# Patient Record
Sex: FEMALE | Race: OTHER | HISPANIC OR LATINO
[De-identification: names, ages, dates, MRNs, and addresses within clinical notes are randomized per-mention and may not be internally consistent; named-entity substitution may affect disease eponyms.]

---

## 2017-01-03 ENCOUNTER — APPOINTMENT (OUTPATIENT)
Dept: INTERNAL MEDICINE | Facility: CLINIC | Age: 67
End: 2017-01-03

## 2017-01-03 VITALS
BODY MASS INDEX: 26.87 KG/M2 | TEMPERATURE: 98.5 F | WEIGHT: 128 LBS | HEIGHT: 58 IN | DIASTOLIC BLOOD PRESSURE: 74 MMHG | SYSTOLIC BLOOD PRESSURE: 129 MMHG

## 2017-01-03 LAB
FLUAV SPEC QL CULT: NORMAL
FLUBV AG SPEC QL IA: NORMAL

## 2017-02-13 ENCOUNTER — APPOINTMENT (OUTPATIENT)
Dept: INTERNAL MEDICINE | Facility: CLINIC | Age: 67
End: 2017-02-13

## 2017-02-13 VITALS — DIASTOLIC BLOOD PRESSURE: 72 MMHG | SYSTOLIC BLOOD PRESSURE: 120 MMHG

## 2017-03-09 DIAGNOSIS — Z88.9 ALLERGY STATUS TO UNSPECIFIED DRUGS, MEDICAMENTS AND BIOLOGICAL SUBSTANCES: ICD-10-CM

## 2017-03-09 DIAGNOSIS — I86.8 VARICOSE VEINS OF OTHER SPECIFIED SITES: ICD-10-CM

## 2017-03-09 DIAGNOSIS — R35.1 NOCTURIA: ICD-10-CM

## 2017-03-22 PROBLEM — R35.1 NOCTURIA: Status: RESOLVED | Noted: 2017-03-22 | Resolved: 2017-03-22

## 2017-03-22 PROBLEM — Z88.9 HISTORY OF SEASONAL ALLERGIES: Status: RESOLVED | Noted: 2017-03-22 | Resolved: 2017-03-22

## 2017-04-11 ENCOUNTER — APPOINTMENT (OUTPATIENT)
Dept: INTERNAL MEDICINE | Facility: CLINIC | Age: 67
End: 2017-04-11

## 2017-04-11 ENCOUNTER — LABORATORY RESULT (OUTPATIENT)
Age: 67
End: 2017-04-11

## 2017-04-11 VITALS — SYSTOLIC BLOOD PRESSURE: 142 MMHG | DIASTOLIC BLOOD PRESSURE: 80 MMHG

## 2017-04-11 VITALS — BODY MASS INDEX: 27.61 KG/M2 | WEIGHT: 128 LBS | HEIGHT: 57.25 IN

## 2017-04-11 DIAGNOSIS — Z00.00 ENCOUNTER FOR GENERAL ADULT MEDICAL EXAMINATION W/OUT ABNORMAL FINDINGS: ICD-10-CM

## 2017-04-11 LAB
BILIRUB UR QL STRIP: NORMAL
CLARITY UR: CLEAR
COLLECTION METHOD: NORMAL
GLUCOSE UR-MCNC: NORMAL
HCG UR QL: 0.2 EU/DL
HGB UR QL STRIP.AUTO: NORMAL
KETONES UR-MCNC: NORMAL
LEUKOCYTE ESTERASE UR QL STRIP: NORMAL
NITRITE UR QL STRIP: NORMAL
PH UR STRIP: 6.5
PROT UR STRIP-MCNC: NORMAL
SP GR UR STRIP: 1.01

## 2017-04-11 RX ORDER — AZITHROMYCIN DIHYDRATE 250 MG/1
250 TABLET, FILM COATED ORAL
Qty: 1 | Refills: 0 | Status: DISCONTINUED | COMMUNITY
Start: 2017-01-03 | End: 2017-04-11

## 2017-04-12 LAB
25(OH)D3 SERPL-MCNC: 29.7 NG/ML
ALBUMIN SERPL ELPH-MCNC: 4.4 G/DL
ALP BLD-CCNC: 78 U/L
ALT SERPL-CCNC: 13 U/L
ANION GAP SERPL CALC-SCNC: 17 MMOL/L
AST SERPL-CCNC: 22 U/L
BASOPHILS # BLD AUTO: 0.02 K/UL
BASOPHILS NFR BLD AUTO: 0.3 %
BILIRUB SERPL-MCNC: 0.4 MG/DL
BUN SERPL-MCNC: 22 MG/DL
CALCIUM SERPL-MCNC: 10 MG/DL
CHLORIDE SERPL-SCNC: 104 MMOL/L
CHOLEST SERPL-MCNC: 224 MG/DL
CHOLEST/HDLC SERPL: 4.4 RATIO
CO2 SERPL-SCNC: 23 MMOL/L
CREAT SERPL-MCNC: 0.94 MG/DL
EOSINOPHIL # BLD AUTO: 0.12 K/UL
EOSINOPHIL NFR BLD AUTO: 1.7 %
GLUCOSE SERPL-MCNC: 77 MG/DL
HBA1C MFR BLD HPLC: 5.7 %
HCT VFR BLD CALC: 39.5 %
HDLC SERPL-MCNC: 51 MG/DL
HGB BLD-MCNC: 12.3 G/DL
IMM GRANULOCYTES NFR BLD AUTO: 0.1 %
LDLC SERPL CALC-MCNC: 144 MG/DL
LYMPHOCYTES # BLD AUTO: 2.27 K/UL
LYMPHOCYTES NFR BLD AUTO: 32.7 %
MAN DIFF?: NORMAL
MCHC RBC-ENTMCNC: 29.8 PG
MCHC RBC-ENTMCNC: 31.1 GM/DL
MCV RBC AUTO: 95.6 FL
MONOCYTES # BLD AUTO: 0.55 K/UL
MONOCYTES NFR BLD AUTO: 7.9 %
NEUTROPHILS # BLD AUTO: 3.97 K/UL
NEUTROPHILS NFR BLD AUTO: 57.3 %
PLATELET # BLD AUTO: 250 K/UL
POTASSIUM SERPL-SCNC: 4.6 MMOL/L
PROT SERPL-MCNC: 7.7 G/DL
RBC # BLD: 4.13 M/UL
RBC # FLD: 14.8 %
SAVE SPECIMEN: NORMAL
SODIUM SERPL-SCNC: 144 MMOL/L
T3RU NFR SERPL: 0.99 INDEX
T4 SERPL-MCNC: 6.4 UG/DL
TRIGL SERPL-MCNC: 147 MG/DL
TSH SERPL-ACNC: 2.56 UIU/ML
URATE SERPL-MCNC: 3.9 MG/DL
WBC # FLD AUTO: 6.94 K/UL

## 2017-05-04 ENCOUNTER — APPOINTMENT (OUTPATIENT)
Dept: INTERNAL MEDICINE | Facility: CLINIC | Age: 67
End: 2017-05-04

## 2017-05-04 VITALS — HEIGHT: 57.25 IN | BODY MASS INDEX: 28.48 KG/M2 | WEIGHT: 132 LBS | TEMPERATURE: 97.9 F

## 2017-06-20 ENCOUNTER — APPOINTMENT (OUTPATIENT)
Dept: MRI IMAGING | Facility: CLINIC | Age: 67
End: 2017-06-20

## 2017-06-20 ENCOUNTER — OUTPATIENT (OUTPATIENT)
Dept: OUTPATIENT SERVICES | Facility: HOSPITAL | Age: 67
LOS: 1 days | End: 2017-06-20
Payer: MEDICARE

## 2017-06-20 DIAGNOSIS — Z00.8 ENCOUNTER FOR OTHER GENERAL EXAMINATION: ICD-10-CM

## 2017-06-20 PROCEDURE — 73221 MRI JOINT UPR EXTREM W/O DYE: CPT

## 2017-09-27 ENCOUNTER — APPOINTMENT (OUTPATIENT)
Dept: ORTHOPEDIC SURGERY | Facility: CLINIC | Age: 67
End: 2017-09-27
Payer: MEDICARE

## 2017-09-27 VITALS — BODY MASS INDEX: 26.21 KG/M2 | WEIGHT: 130 LBS | HEIGHT: 59 IN

## 2017-09-27 VITALS — DIASTOLIC BLOOD PRESSURE: 88 MMHG | SYSTOLIC BLOOD PRESSURE: 157 MMHG | HEART RATE: 62 BPM

## 2017-09-27 DIAGNOSIS — M75.21 BICIPITAL TENDINITIS, RIGHT SHOULDER: ICD-10-CM

## 2017-09-27 PROCEDURE — 99203 OFFICE O/P NEW LOW 30 MIN: CPT

## 2017-10-07 ENCOUNTER — TRANSCRIPTION ENCOUNTER (OUTPATIENT)
Age: 67
End: 2017-10-07

## 2017-10-09 ENCOUNTER — APPOINTMENT (OUTPATIENT)
Dept: INTERNAL MEDICINE | Facility: CLINIC | Age: 67
End: 2017-10-09
Payer: MEDICARE

## 2017-10-09 VITALS
WEIGHT: 127 LBS | SYSTOLIC BLOOD PRESSURE: 121 MMHG | TEMPERATURE: 98.5 F | DIASTOLIC BLOOD PRESSURE: 83 MMHG | BODY MASS INDEX: 25.6 KG/M2 | HEART RATE: 81 BPM | HEIGHT: 59 IN

## 2017-10-09 PROCEDURE — 99214 OFFICE O/P EST MOD 30 MIN: CPT

## 2018-01-16 ENCOUNTER — OUTPATIENT (OUTPATIENT)
Dept: OUTPATIENT SERVICES | Facility: HOSPITAL | Age: 68
LOS: 1 days | End: 2018-01-16
Payer: MEDICARE

## 2018-01-16 VITALS
WEIGHT: 130.07 LBS | DIASTOLIC BLOOD PRESSURE: 72 MMHG | HEART RATE: 60 BPM | SYSTOLIC BLOOD PRESSURE: 128 MMHG | OXYGEN SATURATION: 99 % | RESPIRATION RATE: 16 BRPM | HEIGHT: 58 IN | TEMPERATURE: 99 F

## 2018-01-16 DIAGNOSIS — S46.811A STRAIN OF OTHER MUSCLES, FASCIA AND TENDONS AT SHOULDER AND UPPER ARM LEVEL, RIGHT ARM, INITIAL ENCOUNTER: ICD-10-CM

## 2018-01-16 DIAGNOSIS — G47.33 OBSTRUCTIVE SLEEP APNEA (ADULT) (PEDIATRIC): ICD-10-CM

## 2018-01-16 DIAGNOSIS — K21.9 GASTRO-ESOPHAGEAL REFLUX DISEASE WITHOUT ESOPHAGITIS: ICD-10-CM

## 2018-01-16 DIAGNOSIS — Z98.890 OTHER SPECIFIED POSTPROCEDURAL STATES: Chronic | ICD-10-CM

## 2018-01-16 LAB
BUN SERPL-MCNC: 18 MG/DL — SIGNIFICANT CHANGE UP (ref 7–23)
CALCIUM SERPL-MCNC: 9.6 MG/DL — SIGNIFICANT CHANGE UP (ref 8.4–10.5)
CHLORIDE SERPL-SCNC: 104 MMOL/L — SIGNIFICANT CHANGE UP (ref 98–107)
CO2 SERPL-SCNC: 28 MMOL/L — SIGNIFICANT CHANGE UP (ref 22–31)
CREAT SERPL-MCNC: 0.83 MG/DL — SIGNIFICANT CHANGE UP (ref 0.5–1.3)
GLUCOSE SERPL-MCNC: 80 MG/DL — SIGNIFICANT CHANGE UP (ref 70–99)
HCT VFR BLD CALC: 37.8 % — SIGNIFICANT CHANGE UP (ref 34.5–45)
HGB BLD-MCNC: 12 G/DL — SIGNIFICANT CHANGE UP (ref 11.5–15.5)
MCHC RBC-ENTMCNC: 29.5 PG — SIGNIFICANT CHANGE UP (ref 27–34)
MCHC RBC-ENTMCNC: 31.7 % — LOW (ref 32–36)
MCV RBC AUTO: 92.9 FL — SIGNIFICANT CHANGE UP (ref 80–100)
NRBC # FLD: 0 — SIGNIFICANT CHANGE UP
PLATELET # BLD AUTO: 212 K/UL — SIGNIFICANT CHANGE UP (ref 150–400)
PMV BLD: 12.4 FL — SIGNIFICANT CHANGE UP (ref 7–13)
POTASSIUM SERPL-MCNC: 4.4 MMOL/L — SIGNIFICANT CHANGE UP (ref 3.5–5.3)
POTASSIUM SERPL-SCNC: 4.4 MMOL/L — SIGNIFICANT CHANGE UP (ref 3.5–5.3)
RBC # BLD: 4.07 M/UL — SIGNIFICANT CHANGE UP (ref 3.8–5.2)
RBC # FLD: 13.4 % — SIGNIFICANT CHANGE UP (ref 10.3–14.5)
SODIUM SERPL-SCNC: 144 MMOL/L — SIGNIFICANT CHANGE UP (ref 135–145)
WBC # BLD: 5.93 K/UL — SIGNIFICANT CHANGE UP (ref 3.8–10.5)
WBC # FLD AUTO: 5.93 K/UL — SIGNIFICANT CHANGE UP (ref 3.8–10.5)

## 2018-01-16 PROCEDURE — 93010 ELECTROCARDIOGRAM REPORT: CPT

## 2018-01-16 NOTE — H&P PST ADULT - MUSCULOSKELETAL COMMENTS
right shoulder pain > 1 year preop dx of strain of other muscles, fascia and tendons at shoulder an upper arm level, right arm

## 2018-01-16 NOTE — H&P PST ADULT - NEGATIVE NEUROLOGICAL SYMPTOMS
no syncope/no paresthesias/no vertigo/no weakness/no generalized seizures/no focal seizures/no difficulty walking/no headache

## 2018-01-16 NOTE — H&P PST ADULT - PRO ANTICIPATED DISCH DISP
Called [facility] dept, [facility] does these placements. Transferred to [facility], [facility] need H&P within the past 30 days to be able to schedule appt. Last OV: 03/31/16.    Please advise, thanks.    When H&P addressed need to call [facility] dept to ask them to reach out to Ellyn at pt's facility to schedule appt. [facility] dept: [phone].   home

## 2018-01-16 NOTE — H&P PST ADULT - NEUROLOGICAL DETAILS
cranial nerves intact/alert and oriented x 3 normal strength/responds to pain/alert and oriented x 3/cranial nerves intact/responds to verbal commands

## 2018-01-16 NOTE — H&P PST ADULT - FAMILY HISTORY
Mother  Still living? No  Family history of kidney disease, Age at diagnosis: Age Unknown     Sibling  Still living? Yes, Estimated age: Age Unknown  Family history of diabetes mellitus, Age at diagnosis: Age Unknown  Family history of hypertension, Age at diagnosis: Age Unknown     Sibling  Still living? Yes, Estimated age: Age Unknown  Family history of hypertension, Age at diagnosis: Age Unknown     Sibling  Still living? Yes, Estimated age: Age Unknown  Family history of hypertension, Age at diagnosis: Age Unknown  Family history of heart attack, Age at diagnosis: Age Unknown

## 2018-01-16 NOTE — H&P PST ADULT - ASSESSMENT
68 yo female with preop dx of strain of other muscles, fascia and tendons at shoulder and upper arm level, right arm

## 2018-01-16 NOTE — H&P PST ADULT - PSH
H/O nasal septoplasty  turbinectomy, Uvulopalatopharyngoplasty 7/28/09.  Inguinal Hernia  left Inguinal Hernia repair  2005  [place]  S/P excision of two skin lesions left upper arm.  June 2009  Tubal Ligation  1983

## 2018-01-16 NOTE — H&P PST ADULT - NEGATIVE ENMT SYMPTOMS
no post-nasal discharge/no sinus symptoms/no throat pain/no ear pain/no tinnitus/no dysphagia/no hearing difficulty/no vertigo/no nasal obstruction

## 2018-01-16 NOTE — H&P PST ADULT - HISTORY OF PRESENT ILLNESS
66 yo female with hx of GERD presents to have PST evaluation with c/o right shoulder pain x 1 year, had cortisone injection x 1 with little relief lasted for 2 months, went for surgical evaluation, now scheduled for right shoulder arthroscopy, rotator cuff repair decompression on 1/30/2018.

## 2018-01-16 NOTE — H&P PST ADULT - PMH
Anemia    Bronchitis  May 2008  Deviated Nasal Septum    GERD (Gastroesophageal Reflux Disease)    Heart Murmur  Echo done 10/2009  Asymptomatic :  No CP, no  palpitations, no dizziness  Herniated Disc  Lumbar  Obstructive Sleep Apnea  s/p septoplasty, turbinectomy, uvulopapatophryngoplasty in 2009 - "no CPAP needed after surgery" Anemia    Bronchitis  May 2008  Deviated Nasal Septum    GERD (Gastroesophageal Reflux Disease)    Heart Murmur  Echo done 10/2009  Asymptomatic :  No CP, no  palpitations, no dizziness  Herniated Disc  Lumbar  Obstructive Sleep Apnea  s/p septoplasty, turbinectomy, Uvulopalatopharyngoplasty in 2009 - "no CPAP needed after surgery"

## 2018-01-16 NOTE — H&P PST ADULT - PROBLEM SELECTOR PLAN 1
Scheduled for right shoulder arthroscopy, rotator cuff repair, decompression on 1/30/2018. labs done and results pending. Surgical scrub & preop instruction given and explained. Verbalized understanding.  patient states, medical evaluation requested by surgeon. Will obtain.

## 2018-01-16 NOTE — H&P PST ADULT - NEGATIVE FEMALE-SPECIFIC SYMPTOMS
no dysmenorrhea/no menorrhagia/no abnormal vaginal bleeding/no pelvic pain/no amenorrhea/no spotting/no vaginal discharge

## 2018-01-16 NOTE — H&P PST ADULT - PROBLEM SELECTOR PROBLEM 1
Strain of other muscles, fascia and tendons at shoulder and upper arm level, right arm, initial encounter

## 2018-01-24 ENCOUNTER — APPOINTMENT (OUTPATIENT)
Dept: INTERNAL MEDICINE | Facility: CLINIC | Age: 68
End: 2018-01-24
Payer: MEDICARE

## 2018-01-24 VITALS
BODY MASS INDEX: 26 KG/M2 | DIASTOLIC BLOOD PRESSURE: 80 MMHG | WEIGHT: 129 LBS | HEIGHT: 59 IN | SYSTOLIC BLOOD PRESSURE: 140 MMHG

## 2018-01-24 VITALS — DIASTOLIC BLOOD PRESSURE: 80 MMHG | SYSTOLIC BLOOD PRESSURE: 124 MMHG

## 2018-01-24 VITALS — SYSTOLIC BLOOD PRESSURE: 122 MMHG | DIASTOLIC BLOOD PRESSURE: 60 MMHG

## 2018-01-24 DIAGNOSIS — Z01.818 ENCOUNTER FOR OTHER PREPROCEDURAL EXAMINATION: ICD-10-CM

## 2018-01-24 PROCEDURE — 99214 OFFICE O/P EST MOD 30 MIN: CPT

## 2018-01-24 RX ORDER — METHYLPREDNISOLONE 4 MG/1
4 TABLET ORAL
Qty: 1 | Refills: 0 | Status: DISCONTINUED | COMMUNITY
Start: 2017-05-04 | End: 2018-01-24

## 2018-01-24 RX ORDER — AZITHROMYCIN 250 MG/1
250 TABLET, FILM COATED ORAL
Qty: 6 | Refills: 0 | Status: DISCONTINUED | COMMUNITY
Start: 2017-10-09 | End: 2018-01-24

## 2018-01-30 ENCOUNTER — APPOINTMENT (OUTPATIENT)
Dept: ORTHOPEDIC SURGERY | Facility: AMBULATORY SURGERY CENTER | Age: 68
End: 2018-01-30

## 2018-01-30 ENCOUNTER — OUTPATIENT (OUTPATIENT)
Dept: OUTPATIENT SERVICES | Facility: HOSPITAL | Age: 68
LOS: 1 days | Discharge: HOME | End: 2018-01-30
Payer: MEDICARE

## 2018-01-30 ENCOUNTER — TRANSCRIPTION ENCOUNTER (OUTPATIENT)
Age: 68
End: 2018-01-30

## 2018-01-30 VITALS
TEMPERATURE: 98 F | RESPIRATION RATE: 16 BRPM | WEIGHT: 130.07 LBS | HEART RATE: 58 BPM | OXYGEN SATURATION: 100 % | DIASTOLIC BLOOD PRESSURE: 57 MMHG | HEIGHT: 58 IN | SYSTOLIC BLOOD PRESSURE: 155 MMHG

## 2018-01-30 VITALS
RESPIRATION RATE: 12 BRPM | DIASTOLIC BLOOD PRESSURE: 82 MMHG | SYSTOLIC BLOOD PRESSURE: 150 MMHG | TEMPERATURE: 98 F | HEART RATE: 72 BPM | OXYGEN SATURATION: 95 %

## 2018-01-30 DIAGNOSIS — Z98.890 OTHER SPECIFIED POSTPROCEDURAL STATES: Chronic | ICD-10-CM

## 2018-01-30 DIAGNOSIS — S46.811A STRAIN OF OTHER MUSCLES, FASCIA AND TENDONS AT SHOULDER AND UPPER ARM LEVEL, RIGHT ARM, INITIAL ENCOUNTER: ICD-10-CM

## 2018-01-30 PROCEDURE — 29827 SHO ARTHRS SRG RT8TR CUF RPR: CPT | Mod: RT

## 2018-01-30 PROCEDURE — 29826 SHO ARTHRS SRG DECOMPRESSION: CPT | Mod: RT

## 2018-01-30 NOTE — BRIEF OPERATIVE NOTE - PROCEDURE
<<-----Click on this checkbox to enter Procedure Complete repair of rotator cuff  01/30/2018  R  Active  SSTEIN2

## 2018-01-30 NOTE — ASU DISCHARGE PLAN (ADULT/PEDIATRIC). - NOTIFY
Pain not relieved by Medications/Persistent Nausea and Vomiting/Fever greater than 101/Bleeding that does not stop/Numbness, color, or temperature change to extremity

## 2018-01-30 NOTE — ASU DISCHARGE PLAN (ADULT/PEDIATRIC). - NURSING INSTRUCTIONS
narcotic pain medicine is constipating, buy over the counter stool softener and take as instructed on the bottle     Do not mix Percocet with Tylenol (acetaminophen) as it already contains Tylenol.

## 2018-01-30 NOTE — ASU DISCHARGE PLAN (ADULT/PEDIATRIC). - ACTIVITY LEVEL
no exercise/no sports/gym/no heavy lifting no heavy lifting/no sports/gym/wear sling at all times/no exercise

## 2018-02-07 ENCOUNTER — APPOINTMENT (OUTPATIENT)
Dept: ORTHOPEDIC SURGERY | Facility: CLINIC | Age: 68
End: 2018-02-07
Payer: MEDICARE

## 2018-02-07 PROCEDURE — 99024 POSTOP FOLLOW-UP VISIT: CPT

## 2018-03-02 ENCOUNTER — APPOINTMENT (OUTPATIENT)
Dept: ORTHOPEDIC SURGERY | Facility: CLINIC | Age: 68
End: 2018-03-02
Payer: MEDICARE

## 2018-03-02 PROCEDURE — 99024 POSTOP FOLLOW-UP VISIT: CPT

## 2018-03-19 ENCOUNTER — APPOINTMENT (OUTPATIENT)
Dept: ORTHOPEDIC SURGERY | Facility: CLINIC | Age: 68
End: 2018-03-19
Payer: MEDICARE

## 2018-03-19 PROCEDURE — 99024 POSTOP FOLLOW-UP VISIT: CPT

## 2018-03-19 PROCEDURE — 73030 X-RAY EXAM OF SHOULDER: CPT | Mod: RT

## 2018-04-13 ENCOUNTER — APPOINTMENT (OUTPATIENT)
Dept: ORTHOPEDIC SURGERY | Facility: CLINIC | Age: 68
End: 2018-04-13
Payer: MEDICARE

## 2018-04-13 DIAGNOSIS — M25.511 PAIN IN RIGHT SHOULDER: ICD-10-CM

## 2018-04-13 PROCEDURE — 99024 POSTOP FOLLOW-UP VISIT: CPT

## 2018-05-14 ENCOUNTER — APPOINTMENT (OUTPATIENT)
Age: 68
End: 2018-05-14
Payer: MEDICARE

## 2018-05-14 VITALS — BODY MASS INDEX: 26.76 KG/M2 | WEIGHT: 131 LBS | HEIGHT: 58.5 IN

## 2018-05-14 VITALS — WEIGHT: 129 LBS | BODY MASS INDEX: 26.5 KG/M2

## 2018-05-14 DIAGNOSIS — H10.10 ACUTE ATOPIC CONJUNCTIVITIS, UNSPECIFIED EYE: ICD-10-CM

## 2018-05-14 PROCEDURE — 99213 OFFICE O/P EST LOW 20 MIN: CPT

## 2018-05-14 RX ORDER — BENZONATATE 200 MG/1
200 CAPSULE ORAL
Qty: 15 | Refills: 0 | Status: DISCONTINUED | COMMUNITY
Start: 2017-10-07 | End: 2018-05-14

## 2018-05-14 RX ORDER — MELOXICAM 7.5 MG/1
7.5 TABLET ORAL DAILY
Qty: 30 | Refills: 0 | Status: DISCONTINUED | COMMUNITY
Start: 2018-04-13 | End: 2018-05-14

## 2018-05-14 RX ORDER — OXYCODONE AND ACETAMINOPHEN 5; 325 MG/1; MG/1
5-325 TABLET ORAL
Qty: 40 | Refills: 0 | Status: DISCONTINUED | COMMUNITY
Start: 2018-01-30 | End: 2018-05-14

## 2018-05-25 ENCOUNTER — APPOINTMENT (OUTPATIENT)
Dept: ORTHOPEDIC SURGERY | Facility: CLINIC | Age: 68
End: 2018-05-25
Payer: MEDICARE

## 2018-05-25 PROCEDURE — 99213 OFFICE O/P EST LOW 20 MIN: CPT

## 2018-08-22 ENCOUNTER — APPOINTMENT (OUTPATIENT)
Dept: ORTHOPEDIC SURGERY | Facility: CLINIC | Age: 68
End: 2018-08-22
Payer: MEDICARE

## 2018-08-22 VITALS
WEIGHT: 129 LBS | HEART RATE: 64 BPM | SYSTOLIC BLOOD PRESSURE: 132 MMHG | DIASTOLIC BLOOD PRESSURE: 78 MMHG | BODY MASS INDEX: 26.35 KG/M2 | HEIGHT: 58.5 IN

## 2018-08-22 DIAGNOSIS — M77.01 MEDIAL EPICONDYLITIS, RIGHT ELBOW: ICD-10-CM

## 2018-08-22 DIAGNOSIS — S46.811D STRAIN OF OTHER MUSCLES, FASCIA AND TENDONS AT SHOULDER AND UPPER ARM LEVEL, RIGHT ARM, SUBSEQUENT ENCOUNTER: ICD-10-CM

## 2018-08-22 PROCEDURE — 99213 OFFICE O/P EST LOW 20 MIN: CPT

## 2018-08-31 ENCOUNTER — MESSAGE (OUTPATIENT)
Age: 68
End: 2018-08-31

## 2018-09-17 ENCOUNTER — LABORATORY RESULT (OUTPATIENT)
Age: 68
End: 2018-09-17

## 2018-09-17 ENCOUNTER — NON-APPOINTMENT (OUTPATIENT)
Age: 68
End: 2018-09-17

## 2018-09-17 ENCOUNTER — APPOINTMENT (OUTPATIENT)
Dept: INTERNAL MEDICINE | Facility: CLINIC | Age: 68
End: 2018-09-17
Payer: MEDICARE

## 2018-09-17 VITALS — BODY MASS INDEX: 26.76 KG/M2 | WEIGHT: 131 LBS | HEIGHT: 58.5 IN

## 2018-09-17 VITALS — SYSTOLIC BLOOD PRESSURE: 120 MMHG | DIASTOLIC BLOOD PRESSURE: 70 MMHG

## 2018-09-17 PROCEDURE — 99214 OFFICE O/P EST MOD 30 MIN: CPT | Mod: 25

## 2018-09-17 PROCEDURE — 93000 ELECTROCARDIOGRAM COMPLETE: CPT

## 2018-09-17 PROCEDURE — 36415 COLL VENOUS BLD VENIPUNCTURE: CPT

## 2018-09-17 NOTE — REVIEW OF SYSTEMS
[Vision Problems] : vision problems [Joint Pain] : joint pain [Joint Stiffness] : joint stiffness [Joint Swelling] : joint swelling [Muscle Pain] : muscle pain [Dizziness] : dizziness [Anxiety] : anxiety [Negative] : Gastrointestinal [de-identified] : Droopiness roger face left

## 2018-09-17 NOTE — ASSESSMENT
[FreeTextEntry1] : The patient clearly has neurological deficit associated with mild left on a droopiness and left facial weakness. She was referred to neurology stat.

## 2018-09-17 NOTE — HISTORY OF PRESENT ILLNESS
[de-identified] : This is 59-year-old patient with history of hypertension, hypercholesterolemia, GERD who presents today with weakness on the left side of the face for 2 days duration. She also noticed a little droopiness of the left eyelid

## 2018-09-17 NOTE — PHYSICAL EXAM
[Normal Outer Ear/Nose] : the outer ears and nose were normal in appearance [Normal Oropharynx] : the oropharynx was normal [No JVD] : no jugular venous distention [Supple] : supple [No Respiratory Distress] : no respiratory distress  [Clear to Auscultation] : lungs were clear to auscultation bilaterally [Normal Rate] : normal rate  [Regular Rhythm] : with a regular rhythm [Normal S1, S2] : normal S1 and S2 [Soft] : abdomen soft [Non Tender] : non-tender [No HSM] : no HSM [Normal Bowel Sounds] : normal bowel sounds [de-identified] : Normal troponins he left eyelid [de-identified] : Left facial droop

## 2018-09-18 LAB
25(OH)D3 SERPL-MCNC: 27.4 NG/ML
ALBUMIN SERPL ELPH-MCNC: 4.3 G/DL
ALP BLD-CCNC: 90 U/L
ALT SERPL-CCNC: 13 U/L
ANION GAP SERPL CALC-SCNC: 12 MMOL/L
AST SERPL-CCNC: 25 U/L
BASOPHILS # BLD AUTO: 0.03 K/UL
BASOPHILS NFR BLD AUTO: 0.7 %
BILIRUB SERPL-MCNC: 0.3 MG/DL
BUN SERPL-MCNC: 18 MG/DL
CALCIUM SERPL-MCNC: 9.4 MG/DL
CHLORIDE SERPL-SCNC: 106 MMOL/L
CHOLEST SERPL-MCNC: 196 MG/DL
CHOLEST/HDLC SERPL: 5.9 RATIO
CO2 SERPL-SCNC: 26 MMOL/L
CREAT SERPL-MCNC: 0.81 MG/DL
EOSINOPHIL # BLD AUTO: 0.16 K/UL
EOSINOPHIL NFR BLD AUTO: 3.5 %
ERYTHROCYTE [SEDIMENTATION RATE] IN BLOOD BY WESTERGREN METHOD: 14 MM/HR
GLUCOSE SERPL-MCNC: 66 MG/DL
HBA1C MFR BLD HPLC: 5.4 %
HCT VFR BLD CALC: 38 %
HDLC SERPL-MCNC: 33 MG/DL
HGB BLD-MCNC: 11.6 G/DL
IMM GRANULOCYTES NFR BLD AUTO: 0.2 %
LDLC SERPL CALC-MCNC: 101 MG/DL
LYMPHOCYTES # BLD AUTO: 1.9 K/UL
LYMPHOCYTES NFR BLD AUTO: 41.5 %
MAN DIFF?: NORMAL
MCHC RBC-ENTMCNC: 29.3 PG
MCHC RBC-ENTMCNC: 30.5 GM/DL
MCV RBC AUTO: 96 FL
MONOCYTES # BLD AUTO: 0.45 K/UL
MONOCYTES NFR BLD AUTO: 9.8 %
NEUTROPHILS # BLD AUTO: 2.03 K/UL
NEUTROPHILS NFR BLD AUTO: 44.3 %
PLATELET # BLD AUTO: 229 K/UL
POTASSIUM SERPL-SCNC: 4.2 MMOL/L
PROT SERPL-MCNC: 7.4 G/DL
RBC # BLD: 3.96 M/UL
RBC # FLD: 14.4 %
SAVE SPECIMEN: NORMAL
SODIUM SERPL-SCNC: 144 MMOL/L
T3RU NFR SERPL: 1.05 INDEX
T4 SERPL-MCNC: 5.3 UG/DL
TRIGL SERPL-MCNC: 311 MG/DL
TSH SERPL-ACNC: 2.02 UIU/ML
URATE SERPL-MCNC: 3.6 MG/DL
WBC # FLD AUTO: 4.58 K/UL

## 2018-09-24 LAB
ANA PAT FLD IF-IMP: ABNORMAL
ANA SER IF-ACNC: ABNORMAL
B BURGDOR AB SER-IMP: NEGATIVE
B BURGDOR IGM PATRN SER IB-IMP: NEGATIVE
B BURGDOR18/20KD IGM SER QL IB: NORMAL
B BURGDOR18KD IGG SER QL IB: NORMAL
B BURGDOR23KD IGG SER QL IB: NORMAL
B BURGDOR23KD IGM SER QL IB: NORMAL
B BURGDOR28KD AB SER QL IB: NORMAL
B BURGDOR28KD IGG SER QL IB: NORMAL
B BURGDOR30KD AB SER QL IB: NORMAL
B BURGDOR30KD IGG SER QL IB: NORMAL
B BURGDOR31KD IGG SER QL IB: NORMAL
B BURGDOR31KD IGM SER QL IB: NORMAL
B BURGDOR39KD IGG SER QL IB: NORMAL
B BURGDOR39KD IGM SER QL IB: NORMAL
B BURGDOR41KD IGG SER QL IB: NORMAL
B BURGDOR41KD IGM SER QL IB: NORMAL
B BURGDOR45KD AB SER QL IB: NORMAL
B BURGDOR45KD IGG SER QL IB: NORMAL
B BURGDOR58KD AB SER QL IB: NORMAL
B BURGDOR58KD IGG SER QL IB: NORMAL
B BURGDOR66KD IGG SER QL IB: NORMAL
B BURGDOR66KD IGM SER QL IB: NORMAL
B BURGDOR93KD IGG SER QL IB: NORMAL
B BURGDOR93KD IGM SER QL IB: NORMAL

## 2018-10-24 ENCOUNTER — MEDICATION RENEWAL (OUTPATIENT)
Age: 68
End: 2018-10-24

## 2018-12-11 PROBLEM — Z87.09 HISTORY OF BRONCHITIS: Status: RESOLVED | Noted: 2018-12-11 | Resolved: 2018-12-11

## 2018-12-11 PROBLEM — Z87.39 HISTORY OF HERNIATED INTERVERTEBRAL DISC: Status: RESOLVED | Noted: 2018-12-11 | Resolved: 2018-12-11

## 2018-12-11 PROBLEM — Z87.39 HISTORY OF NECK PAIN: Status: RESOLVED | Noted: 2018-12-11 | Resolved: 2018-12-11

## 2018-12-12 ENCOUNTER — LABORATORY RESULT (OUTPATIENT)
Age: 68
End: 2018-12-12

## 2018-12-12 ENCOUNTER — NON-APPOINTMENT (OUTPATIENT)
Age: 68
End: 2018-12-12

## 2018-12-12 ENCOUNTER — APPOINTMENT (OUTPATIENT)
Dept: INTERNAL MEDICINE | Facility: CLINIC | Age: 68
End: 2018-12-12
Payer: MEDICARE

## 2018-12-12 VITALS
DIASTOLIC BLOOD PRESSURE: 86 MMHG | BODY MASS INDEX: 27.29 KG/M2 | SYSTOLIC BLOOD PRESSURE: 138 MMHG | HEIGHT: 58 IN | WEIGHT: 130 LBS

## 2018-12-12 VITALS — DIASTOLIC BLOOD PRESSURE: 86 MMHG | SYSTOLIC BLOOD PRESSURE: 138 MMHG

## 2018-12-12 DIAGNOSIS — Z87.39 PERSONAL HISTORY OF OTHER DISEASES OF THE MUSCULOSKELETAL SYSTEM AND CONNECTIVE TISSUE: ICD-10-CM

## 2018-12-12 DIAGNOSIS — Z87.09 PERSONAL HISTORY OF OTHER DISEASES OF THE RESPIRATORY SYSTEM: ICD-10-CM

## 2018-12-12 LAB
BILIRUB UR QL STRIP: NORMAL
CLARITY UR: CLEAR
COLLECTION METHOD: NORMAL
GLUCOSE UR-MCNC: NORMAL
HCG UR QL: 0.2 EU/DL
HGB UR QL STRIP.AUTO: NORMAL
KETONES UR-MCNC: NORMAL
LEUKOCYTE ESTERASE UR QL STRIP: NORMAL
NITRITE UR QL STRIP: NORMAL
PH UR STRIP: 6
PROT UR STRIP-MCNC: NORMAL
SP GR UR STRIP: 1

## 2018-12-12 PROCEDURE — G0008: CPT

## 2018-12-12 PROCEDURE — 90686 IIV4 VACC NO PRSV 0.5 ML IM: CPT

## 2018-12-12 PROCEDURE — 36415 COLL VENOUS BLD VENIPUNCTURE: CPT

## 2018-12-12 PROCEDURE — 93000 ELECTROCARDIOGRAM COMPLETE: CPT | Mod: 59

## 2018-12-12 PROCEDURE — G0439: CPT | Mod: GA

## 2018-12-12 PROCEDURE — 81003 URINALYSIS AUTO W/O SCOPE: CPT | Mod: QW

## 2018-12-12 RX ORDER — BROMPHENIRAMINE MALEATE, PSEUDOEPHEDRINE HYDROCHLORIDE, 2; 30; 10 MG/5ML; MG/5ML; MG/5ML
30-2-10 SYRUP ORAL
Qty: 280 | Refills: 0 | Status: DISCONTINUED | COMMUNITY
Start: 2017-10-07 | End: 2018-12-12

## 2018-12-12 NOTE — HISTORY OF PRESENT ILLNESS
[FreeTextEntry1] : This is a 60-year-old female for annual health assessment [de-identified] : Specifically we will address her history of reflux diverticulosis disc disease sleep apnea and muscle cramps\par \par Patient is feeling well but states she has had several incidents. She had a episode of Bell's palsy. Apparently the workup included imaging which demonstrated some carotid stenosis and she was placed on aspirin\par \par In addition she has had right arm surgery which is healing slowly this is about a year duration\par \par \par \par She has noticed a cyst in her vagina\par \par She continues to complain of symptoms of reflux

## 2018-12-12 NOTE — PHYSICAL EXAM

## 2018-12-12 NOTE — ASSESSMENT
[FreeTextEntry1] : This is a 68-year-old female whose history has been reviewed above\par \par She has a recent history of a Bell's palsy from she has recovered fully. She is concerned apparently she was told that she may have a narrowing in the carotid artery. She was placed on aspirin. I will obtain these records if there is significant stenosis or forPatient is feeling well but states she has had several incidents. She had a episode of Bell's palsy. Apparently the workup included imaging which demonstrated some carotid stenosis and she was placed on aspirin.I will try to obtain the records if there is significant stenosis I will start a statin. I did review a previous MRA which showed no evidence of disease.\par \par In addition she has had right arm surgery which is healing slowly this is about a year duration\par \par She has noticed a cyst in her vagina\par \par She continues to complain of symptoms of reflux\par \par In addition she still gets cramps at night Her serum magnesium was obtained\par \par She does have a history of sleep apnea. She did have surgery and states she is no longer symptomatic and she does not snore no intervention\par \par She is up-to-date with colonoscopy bone density OB/GYN\par \par She will schedule mammography and the shingle shot when she is able

## 2018-12-12 NOTE — HEALTH RISK ASSESSMENT
[Good] : ~his/her~  mood as  good [No falls in past year] : Patient reported no falls in the past year [0] : 2) Feeling down, depressed, or hopeless: Not at all (0) [Hepatitis C test offered] : Hepatitis C test offered [None] : None [With Significant Other] : lives with significant other [Employed] : employed [College] : College [] :  [Sexually Active] : sexually active [Feels Safe at Home] : Feels safe at home [Fully functional (bathing, dressing, toileting, transferring, walking, feeding)] : Fully functional (bathing, dressing, toileting, transferring, walking, feeding) [Fully functional (using the telephone, shopping, preparing meals, housekeeping, doing laundry, using] : Fully functional and needs no help or supervision to perform IADLs (using the telephone, shopping, preparing meals, housekeeping, doing laundry, using transportation, managing medications and managing finances) [Smoke Detector] : smoke detector [Carbon Monoxide Detector] : carbon monoxide detector [Seat Belt] :  uses seat belt [Sunscreen] : uses sunscreen [No changes since last discussed ___] : No changes since last discussed  [unfilled] [] : No [Change in mental status noted] : No change in mental status noted [Reports changes in hearing] : Reports no changes in hearing [Reports changes in vision] : Reports no changes in vision [Reports changes in dental health] : Reports no changes in dental health [Guns at Home] : no guns at home [Safety elements used in home] : no safety elements used in home [Travel to Developing Areas] : does not  travel to developing areas [TB Exposure] : is not being exposed to tuberculosis [Caregiver Concerns] : does not have caregiver concerns [MammogramDate] : 12/2016 [ColonoscopyDate] : 12/2014

## 2018-12-13 LAB
25(OH)D3 SERPL-MCNC: 23.4 NG/ML
ALBUMIN SERPL ELPH-MCNC: 4.2 G/DL
ALP BLD-CCNC: 104 U/L
ALT SERPL-CCNC: 20 U/L
ANION GAP SERPL CALC-SCNC: 10 MMOL/L
AST SERPL-CCNC: 27 U/L
BASOPHILS # BLD AUTO: 0.02 K/UL
BASOPHILS NFR BLD AUTO: 0.4 %
BILIRUB SERPL-MCNC: 0.2 MG/DL
BUN SERPL-MCNC: 21 MG/DL
CALCIUM SERPL-MCNC: 9.4 MG/DL
CHLORIDE SERPL-SCNC: 104 MMOL/L
CHOLEST SERPL-MCNC: 194 MG/DL
CHOLEST/HDLC SERPL: 5.5 RATIO
CO2 SERPL-SCNC: 28 MMOL/L
CREAT SERPL-MCNC: 0.78 MG/DL
EOSINOPHIL # BLD AUTO: 0.28 K/UL
EOSINOPHIL NFR BLD AUTO: 5.7 %
GLUCOSE SERPL-MCNC: 76 MG/DL
HBA1C MFR BLD HPLC: 5.6 %
HCT VFR BLD CALC: 37 %
HCV AB SER QL: NONREACTIVE
HCV S/CO RATIO: 0.18 S/CO
HDLC SERPL-MCNC: 35 MG/DL
HGB BLD-MCNC: 11.8 G/DL
IMM GRANULOCYTES NFR BLD AUTO: 0.2 %
LDLC SERPL CALC-MCNC: 103 MG/DL
LYMPHOCYTES # BLD AUTO: 1.67 K/UL
LYMPHOCYTES NFR BLD AUTO: 33.9 %
MAGNESIUM SERPL-MCNC: 2 MG/DL
MAN DIFF?: NORMAL
MCHC RBC-ENTMCNC: 29.1 PG
MCHC RBC-ENTMCNC: 31.9 GM/DL
MCV RBC AUTO: 91.4 FL
MONOCYTES # BLD AUTO: 0.41 K/UL
MONOCYTES NFR BLD AUTO: 8.3 %
NEUTROPHILS # BLD AUTO: 2.54 K/UL
NEUTROPHILS NFR BLD AUTO: 51.5 %
PLATELET # BLD AUTO: 208 K/UL
POTASSIUM SERPL-SCNC: 4.5 MMOL/L
PROT SERPL-MCNC: 7.2 G/DL
RBC # BLD: 4.05 M/UL
RBC # FLD: 13.7 %
SAVE SPECIMEN: NORMAL
SODIUM SERPL-SCNC: 142 MMOL/L
T3RU NFR SERPL: 1.08 INDEX
T4 SERPL-MCNC: 5.1 UG/DL
TRIGL SERPL-MCNC: 278 MG/DL
TSH SERPL-ACNC: 3.14 UIU/ML
URATE SERPL-MCNC: 3.4 MG/DL
WBC # FLD AUTO: 4.93 K/UL

## 2019-01-23 ENCOUNTER — APPOINTMENT (OUTPATIENT)
Dept: INTERNAL MEDICINE | Facility: CLINIC | Age: 69
End: 2019-01-23
Payer: MEDICARE

## 2019-01-23 VITALS
DIASTOLIC BLOOD PRESSURE: 72 MMHG | BODY MASS INDEX: 27.29 KG/M2 | HEART RATE: 87 BPM | HEIGHT: 58 IN | SYSTOLIC BLOOD PRESSURE: 138 MMHG | WEIGHT: 130 LBS | TEMPERATURE: 98.7 F

## 2019-01-23 LAB
BILIRUB UR QL STRIP: NORMAL
CLARITY UR: CLEAR
COLLECTION METHOD: NORMAL
FLUAV SPEC QL CULT: NORMAL
FLUBV AG SPEC QL IA: NORMAL
GLUCOSE UR-MCNC: NORMAL
HCG UR QL: 0.2 EU/DL
HGB UR QL STRIP.AUTO: NORMAL
KETONES UR-MCNC: NORMAL
LEUKOCYTE ESTERASE UR QL STRIP: NORMAL
NITRITE UR QL STRIP: NORMAL
PH UR STRIP: 6.5
PROT UR STRIP-MCNC: NORMAL
SP GR UR STRIP: 1.01

## 2019-01-23 PROCEDURE — 99214 OFFICE O/P EST MOD 30 MIN: CPT | Mod: 25

## 2019-01-23 PROCEDURE — 87804 INFLUENZA ASSAY W/OPTIC: CPT | Mod: QW

## 2019-01-23 PROCEDURE — 81003 URINALYSIS AUTO W/O SCOPE: CPT | Mod: QW

## 2019-01-23 RX ORDER — ALBUTEROL SULFATE 90 UG/1
108 (90 BASE) AEROSOL, METERED RESPIRATORY (INHALATION)
Qty: 85 | Refills: 0 | Status: DISCONTINUED | COMMUNITY
Start: 2017-10-07 | End: 2019-01-23

## 2019-01-23 RX ORDER — BENZOCAINE/GLYCERIN 5 %-33 %
5-33 SPRAY, NON-AEROSOL (ML) MUCOUS MEMBRANE
Qty: 22.2 | Refills: 0 | Status: DISCONTINUED | COMMUNITY
Start: 2017-10-09 | End: 2019-01-23

## 2019-01-23 RX ORDER — AZELASTINE HYDROCHLORIDE AND FLUTICASONE PROPIONATE 137; 50 UG/1; UG/1
137-50 SPRAY, METERED NASAL DAILY
Qty: 1 | Refills: 3 | Status: DISCONTINUED | COMMUNITY
Start: 2017-05-04 | End: 2019-01-23

## 2019-01-23 NOTE — HISTORY OF PRESENT ILLNESS
[FreeTextEntry8] : 68 year old female here today with c/o not feeling well. \par Flu like symptoms since last night.  She has a cough as well.  She had fevers, chills, body aches.  \par She has a headache. \par She has had fevers of 102.9.  It came on suddenly last night. \par The cough is bad, her throat is very bad. \par patient also has lower back pain\par She also has pain when she urinates. \par SHe is also nauseated. \par She has no appetite. \par

## 2019-01-23 NOTE — ASSESSMENT
[FreeTextEntry1] : Flu like symptoms: \par OTC agents\par RVP rapid \par rest, fluids\par tylenol for fever\par \par UTI: start abx \par fluids

## 2019-01-23 NOTE — REVIEW OF SYSTEMS
[Fever] : fever [Chills] : chills [Fatigue] : fatigue [Nasal Discharge] : nasal discharge [Sore Throat] : sore throat [Cough] : cough [Negative] : Cardiovascular [Shortness Of Breath] : no shortness of breath [Wheezing] : no wheezing [Nausea] : nausea [Dysuria] : dysuria

## 2019-01-23 NOTE — PHYSICAL EXAM
[No Acute Distress] : no acute distress [Well Nourished] : well nourished [Well Developed] : well developed [No Respiratory Distress] : no respiratory distress  [Clear to Auscultation] : lungs were clear to auscultation bilaterally [No Accessory Muscle Use] : no accessory muscle use [Normal Rate] : normal rate  [Regular Rhythm] : with a regular rhythm [Normal S1, S2] : normal S1 and S2 [Normal Affect] : the affect was normal [Normal Insight/Judgement] : insight and judgment were intact [de-identified] : slitly reddened oropharynx

## 2019-01-24 LAB — RAPID RVP RESULT: NOT DETECTED

## 2019-01-25 LAB — BACTERIA UR CULT: NORMAL

## 2019-01-25 RX ORDER — NITROFURANTOIN (MONOHYDRATE/MACROCRYSTALS) 25; 75 MG/1; MG/1
100 CAPSULE ORAL TWICE DAILY
Qty: 14 | Refills: 0 | Status: DISCONTINUED | COMMUNITY
Start: 2019-01-23 | End: 2019-01-25

## 2019-01-31 ENCOUNTER — OTHER (OUTPATIENT)
Age: 69
End: 2019-01-31

## 2019-07-11 ENCOUNTER — APPOINTMENT (OUTPATIENT)
Dept: INTERNAL MEDICINE | Facility: CLINIC | Age: 69
End: 2019-07-11

## 2019-07-11 ENCOUNTER — APPOINTMENT (OUTPATIENT)
Dept: INTERNAL MEDICINE | Facility: CLINIC | Age: 69
End: 2019-07-11
Payer: MEDICARE

## 2019-07-11 PROCEDURE — 77080 DXA BONE DENSITY AXIAL: CPT

## 2019-09-19 ENCOUNTER — TRANSCRIPTION ENCOUNTER (OUTPATIENT)
Age: 69
End: 2019-09-19

## 2019-10-14 ENCOUNTER — APPOINTMENT (OUTPATIENT)
Dept: INTERNAL MEDICINE | Facility: CLINIC | Age: 69
End: 2019-10-14
Payer: MEDICARE

## 2019-10-14 VITALS
HEART RATE: 73 BPM | SYSTOLIC BLOOD PRESSURE: 146 MMHG | WEIGHT: 130 LBS | HEIGHT: 58 IN | TEMPERATURE: 98 F | DIASTOLIC BLOOD PRESSURE: 80 MMHG | BODY MASS INDEX: 27.29 KG/M2

## 2019-10-14 DIAGNOSIS — Z87.09 PERSONAL HISTORY OF OTHER DISEASES OF THE RESPIRATORY SYSTEM: ICD-10-CM

## 2019-10-14 PROCEDURE — 99213 OFFICE O/P EST LOW 20 MIN: CPT

## 2019-10-14 RX ORDER — PROMETHAZINE HYDROCHLORIDE AND DEXTROMETHORPHAN HYDROBROMIDE ORAL SOLUTION 15; 6.25 MG/5ML; MG/5ML
6.25-15 SOLUTION ORAL
Qty: 1 | Refills: 0 | Status: DISCONTINUED | COMMUNITY
Start: 2019-01-23 | End: 2019-10-14

## 2019-10-14 RX ORDER — BENZONATATE 100 MG/1
100 CAPSULE ORAL 3 TIMES DAILY
Qty: 30 | Refills: 2 | Status: DISCONTINUED | COMMUNITY
Start: 2019-01-28 | End: 2019-10-14

## 2019-10-14 RX ORDER — FLUTICASONE PROPIONATE 50 UG/1
50 SPRAY, METERED NASAL TWICE DAILY
Qty: 1 | Refills: 1 | Status: DISCONTINUED | COMMUNITY
Start: 2019-01-23 | End: 2019-10-14

## 2019-10-14 RX ORDER — FLUTICASONE PROPIONATE 50 UG/1
50 SPRAY, METERED NASAL DAILY
Qty: 1 | Refills: 1 | Status: DISCONTINUED | COMMUNITY
Start: 2017-02-13 | End: 2019-10-14

## 2019-10-14 RX ORDER — AZITHROMYCIN 250 MG/1
250 TABLET, FILM COATED ORAL
Qty: 1 | Refills: 1 | Status: DISCONTINUED | COMMUNITY
Start: 2019-01-25 | End: 2019-10-14

## 2019-10-14 NOTE — PHYSICAL EXAM
[No Edema] : there was no peripheral edema [Normal] : normal rate, regular rhythm, normal S1 and S2 and no murmur heard [Soft] : abdomen soft [Non Tender] : non-tender [Non-distended] : non-distended [Normal Posterior Cervical Nodes] : no posterior cervical lymphadenopathy [Normal Anterior Cervical Nodes] : no anterior cervical lymphadenopathy [No CVA Tenderness] : no CVA  tenderness [No Rash] : no rash [FreeTextEntry1] : Ears: TM's normal bilaterally, middle ear effusion appreciated bilaterally\par Nose: nasal mucosa edematous, clear rhinorrhea, moderate airway obstruction\par No sinus tenderness to percussion\par Pharynx mildly erythematous, no exudates\par \par

## 2019-10-14 NOTE — REVIEW OF SYSTEMS
[Chills] : chills [Fatigue] : fatigue [Earache] : earache [Nasal Discharge] : nasal discharge [Sore Throat] : sore throat [Chest Pain] : no chest pain [Palpitations] : no palpitations [Lower Ext Edema] : no lower extremity edema [Cough] : cough [Orthopnea] : no orthopnea [Nausea] : nausea [Abdominal Pain] : no abdominal pain [Vomiting] : no vomiting [Constipation] : no constipation [Diarrhea] : diarrhea [Dysuria] : no dysuria [Melena] : no melena [Hematuria] : no hematuria [Joint Pain] : no joint pain [Skin Rash] : no skin rash [Muscle Pain] : no muscle pain [Headache] : no headache [Negative] : Eyes

## 2019-10-14 NOTE — PLAN
[FreeTextEntry1] : \par intermittent lumbar pain with h/o osteopenia.  was seen in urgent careand given nsaids and muscle relaxer with good relief, \par -PT referral \par -vitD/calcium supp\par \par acute bronchitis with h/o seasonal allergies \par -zpac, bromfed as needed \par -cont montlukast, albuterol inhaler as needed\par -advised to start allergy meds around this time of year to help prevent sinus congestion and cough\par \par Health maintenance\par -advised to artie annual with PCP, discuss need for shingles vaccine \par \par all questions and concerns addressed with patient in detail.  patient verbalized back instructions in his own words.\par \par

## 2019-10-14 NOTE — HISTORY OF PRESENT ILLNESS
[FreeTextEntry8] : c/o productive cough, sinus congestion, subjective chills for the last 4 days.  mild nausea at times.  h/o seasonal allergies.   suffers from Crohns disease, with similar symptoms \par no vomiting, abdominal pain, palpitations or chest pain \par

## 2019-12-10 PROBLEM — R25.2 NIGHT CRAMPS: Status: RESOLVED | Noted: 2019-12-10 | Resolved: 2019-12-10

## 2019-12-10 PROBLEM — N89.8 VAGINAL CYST: Status: RESOLVED | Noted: 2019-12-10 | Resolved: 2019-12-10

## 2019-12-13 ENCOUNTER — APPOINTMENT (OUTPATIENT)
Dept: INTERNAL MEDICINE | Facility: CLINIC | Age: 69
End: 2019-12-13
Payer: MEDICARE

## 2019-12-13 ENCOUNTER — LABORATORY RESULT (OUTPATIENT)
Age: 69
End: 2019-12-13

## 2019-12-13 ENCOUNTER — NON-APPOINTMENT (OUTPATIENT)
Age: 69
End: 2019-12-13

## 2019-12-13 VITALS
DIASTOLIC BLOOD PRESSURE: 80 MMHG | WEIGHT: 127 LBS | SYSTOLIC BLOOD PRESSURE: 138 MMHG | BODY MASS INDEX: 27.03 KG/M2 | HEIGHT: 57.5 IN

## 2019-12-13 DIAGNOSIS — N89.8 OTHER SPECIFIED NONINFLAMMATORY DISORDERS OF VAGINA: ICD-10-CM

## 2019-12-13 DIAGNOSIS — Z86.69 PERSONAL HISTORY OF OTHER DISEASES OF THE NERVOUS SYSTEM AND SENSE ORGANS: ICD-10-CM

## 2019-12-13 DIAGNOSIS — K21.9 GASTRO-ESOPHAGEAL REFLUX DISEASE W/OUT ESOPHAGITIS: ICD-10-CM

## 2019-12-13 DIAGNOSIS — R25.2 CRAMP AND SPASM: ICD-10-CM

## 2019-12-13 LAB
BILIRUB UR QL STRIP: NORMAL
CLARITY UR: CLEAR
COLLECTION METHOD: NORMAL
GLUCOSE UR-MCNC: NORMAL
HCG UR QL: 0.2 EU/DL
HGB UR QL STRIP.AUTO: NORMAL
KETONES UR-MCNC: NORMAL
LEUKOCYTE ESTERASE UR QL STRIP: NORMAL
NITRITE UR QL STRIP: NORMAL
PH UR STRIP: 5.5
PROT UR STRIP-MCNC: NORMAL
SP GR UR STRIP: 1.02

## 2019-12-13 PROCEDURE — 36415 COLL VENOUS BLD VENIPUNCTURE: CPT

## 2019-12-13 PROCEDURE — 99213 OFFICE O/P EST LOW 20 MIN: CPT | Mod: 25

## 2019-12-13 PROCEDURE — G0008: CPT

## 2019-12-13 PROCEDURE — 93000 ELECTROCARDIOGRAM COMPLETE: CPT | Mod: 59

## 2019-12-13 PROCEDURE — 81003 URINALYSIS AUTO W/O SCOPE: CPT | Mod: QW

## 2019-12-13 PROCEDURE — G0439: CPT | Mod: GA

## 2019-12-13 PROCEDURE — 90686 IIV4 VACC NO PRSV 0.5 ML IM: CPT

## 2019-12-13 RX ORDER — BROMPHENIRAMINE MALEATE, PSEUDOEPHEDRINE HYDROCHLORIDE AND DEXTROMETHORPHAN HYDROBROMIDE 2; 30; 10 MG/5ML; MG/5ML; MG/5ML
30-2-10 SYRUP ORAL
Qty: 160 | Refills: 0 | Status: DISCONTINUED | COMMUNITY
Start: 2019-10-14 | End: 2019-12-13

## 2019-12-13 RX ORDER — AZITHROMYCIN 250 MG/1
250 TABLET, FILM COATED ORAL
Qty: 1 | Refills: 0 | Status: DISCONTINUED | COMMUNITY
Start: 2019-10-14 | End: 2019-12-13

## 2019-12-13 NOTE — HEALTH RISK ASSESSMENT
[Very Good] : ~his/her~  mood as very good [Yes] : Yes [No falls in past year] : Patient reported no falls in the past year [0] : 1) Little interest or pleasure doing things: Not at all (0) [Hepatitis C test offered] : Hepatitis C test offered [None] : None [With Significant Other] : lives with significant other [Employed] : employed [] :  [Feels Safe at Home] : Feels safe at home [Fully functional (bathing, dressing, toileting, transferring, walking, feeding)] : Fully functional (bathing, dressing, toileting, transferring, walking, feeding) [Fully functional (using the telephone, shopping, preparing meals, housekeeping, doing laundry, using] : Fully functional and needs no help or supervision to perform IADLs (using the telephone, shopping, preparing meals, housekeeping, doing laundry, using transportation, managing medications and managing finances) [Smoke Detector] : smoke detector [Carbon Monoxide Detector] : carbon monoxide detector [Seat Belt] :  uses seat belt [Sunscreen] : uses sunscreen [I will adhere to the patient's wishes as expressed in the advance directive except as noted below.] : I will adhere to the patient's wishes as expressed in the advance directive except as noted below [] : No [Reports changes in hearing] : Reports no changes in hearing [Sexually Active] : not sexually active [Change in mental status noted] : No change in mental status noted [Reports changes in dental health] : Reports no changes in dental health [Reports changes in vision] : Reports no changes in vision [Safety elements used in home] : no safety elements used in home [Travel to Developing Areas] : does not  travel to developing areas [Guns at Home] : no guns at home [Caregiver Concerns] : does not have caregiver concerns [FreeTextEntry4] :  is healthcare proxy

## 2019-12-13 NOTE — ASSESSMENT
[FreeTextEntry1] : This is a delightful 69-year-old female for annual health assessment\par \par She has a history of sleep apnea in the past she did have surgery she states she was evaluated after surgery and that she no longer has symptoms at this point no intervention\par \par She did have a history of Bell's palsy she is fully recovered her neurologic examination is normal\par \par She has a history of carotid artery stenosis this was noted after a Bell's palsy which he had an MRA she remains on aspirin neurologist\par \par She still has cramps at night I told her to try tonic water\par \par Her blood pressure is just above goal she will exercise lose weight repeated in 6 months\par \par In terms of her reflux I will prescribe Axid and aspirin to take it on a daily basis for 3-4 weeks and then gradually taper\par \par She will see OB/GYN I tried to valerie her fears\par \par She is up-to-date with bone density colonoscopy she will schedule mammography\par \par She will get a flu shot today and I asked her to get the new shingle shot\par \par

## 2019-12-13 NOTE — HISTORY OF PRESENT ILLNESS
[FreeTextEntry1] : This is a 69-year-old female for annual health assessment [de-identified] : Specifically we'll address her history of asthma elevated LDL reflux osteopenia Bell's palsy muscle cramps sleep apnea\par \par Her only complainst seems to be a feeling of fullness in her vagina which may represent a prolapse. She described this as a cyst in the past.n addition she has reflux which she treats on a p.r.n. basis

## 2019-12-13 NOTE — PHYSICAL EXAM
[No Acute Distress] : no acute distress [Well Nourished] : well nourished [Well Developed] : well developed [Well-Appearing] : well-appearing [Normal Sclera/Conjunctiva] : normal sclera/conjunctiva [PERRL] : pupils equal round and reactive to light [Normal Outer Ear/Nose] : the outer ears and nose were normal in appearance [EOMI] : extraocular movements intact [No JVD] : no jugular venous distention [Normal Oropharynx] : the oropharynx was normal [No Lymphadenopathy] : no lymphadenopathy [Thyroid Normal, No Nodules] : the thyroid was normal and there were no nodules present [Supple] : supple [No Accessory Muscle Use] : no accessory muscle use [No Respiratory Distress] : no respiratory distress  [Clear to Auscultation] : lungs were clear to auscultation bilaterally [Normal Rate] : normal rate  [Regular Rhythm] : with a regular rhythm [Normal S1, S2] : normal S1 and S2 [No Murmur] : no murmur heard [No Carotid Bruits] : no carotid bruits [No Varicosities] : no varicosities [No Abdominal Bruit] : a ~M bruit was not heard ~T in the abdomen [Pedal Pulses Present] : the pedal pulses are present [No Palpable Aorta] : no palpable aorta [No Edema] : there was no peripheral edema [No Extremity Clubbing/Cyanosis] : no extremity clubbing/cyanosis [Normal Appearance] : normal in appearance [No Nipple Discharge] : no nipple discharge [No Axillary Lymphadenopathy] : no axillary lymphadenopathy [Soft] : abdomen soft [No Masses] : no abdominal mass palpated [Non-distended] : non-distended [Non Tender] : non-tender [No HSM] : no HSM [Normal Bowel Sounds] : normal bowel sounds [Normal Sphincter Tone] : normal sphincter tone [No Mass] : no mass [Normal Posterior Cervical Nodes] : no posterior cervical lymphadenopathy [No CVA Tenderness] : no CVA  tenderness [Normal Anterior Cervical Nodes] : no anterior cervical lymphadenopathy [No Spinal Tenderness] : no spinal tenderness [No Joint Swelling] : no joint swelling [Grossly Normal Strength/Tone] : grossly normal strength/tone [No Rash] : no rash [Coordination Grossly Intact] : coordination grossly intact [No Focal Deficits] : no focal deficits [Deep Tendon Reflexes (DTR)] : deep tendon reflexes were 2+ and symmetric [Normal Affect] : the affect was normal [Normal Gait] : normal gait [Normal Insight/Judgement] : insight and judgment were intact [Stool Occult Blood] : stool negative for occult blood

## 2019-12-14 LAB
25(OH)D3 SERPL-MCNC: 30.6 NG/ML
ALBUMIN SERPL ELPH-MCNC: 4.3 G/DL
ALP BLD-CCNC: 95 U/L
ALT SERPL-CCNC: 17 U/L
ANION GAP SERPL CALC-SCNC: 9 MMOL/L
AST SERPL-CCNC: 21 U/L
BASOPHILS # BLD AUTO: 0.04 K/UL
BASOPHILS NFR BLD AUTO: 0.7 %
BILIRUB SERPL-MCNC: 0.2 MG/DL
BUN SERPL-MCNC: 22 MG/DL
CALCIUM SERPL-MCNC: 9.5 MG/DL
CHLORIDE SERPL-SCNC: 106 MMOL/L
CHOLEST SERPL-MCNC: 213 MG/DL
CHOLEST/HDLC SERPL: 6.1 RATIO
CO2 SERPL-SCNC: 26 MMOL/L
CREAT SERPL-MCNC: 0.82 MG/DL
EOSINOPHIL # BLD AUTO: 0.29 K/UL
EOSINOPHIL NFR BLD AUTO: 5.3 %
ESTIMATED AVERAGE GLUCOSE: 111 MG/DL
HBA1C MFR BLD HPLC: 5.5 %
HCT VFR BLD CALC: 36.9 %
HCV AB SER QL: NONREACTIVE
HCV S/CO RATIO: 0.21 S/CO
HDLC SERPL-MCNC: 35 MG/DL
HGB BLD-MCNC: 11.5 G/DL
IMM GRANULOCYTES NFR BLD AUTO: 0.2 %
LDLC SERPL CALC-MCNC: 104 MG/DL
LYMPHOCYTES # BLD AUTO: 1.7 K/UL
LYMPHOCYTES NFR BLD AUTO: 31.3 %
MAN DIFF?: NORMAL
MCHC RBC-ENTMCNC: 29.6 PG
MCHC RBC-ENTMCNC: 31.2 GM/DL
MCV RBC AUTO: 94.9 FL
MONOCYTES # BLD AUTO: 0.57 K/UL
MONOCYTES NFR BLD AUTO: 10.5 %
NEUTROPHILS # BLD AUTO: 2.82 K/UL
NEUTROPHILS NFR BLD AUTO: 52 %
PLATELET # BLD AUTO: 218 K/UL
POTASSIUM SERPL-SCNC: 4.7 MMOL/L
PROT SERPL-MCNC: 6.6 G/DL
RBC # BLD: 3.89 M/UL
RBC # FLD: 13.8 %
SAVE SPECIMEN: NORMAL
SODIUM SERPL-SCNC: 141 MMOL/L
T3RU NFR SERPL: 1 TBI
T4 SERPL-MCNC: 5.2 UG/DL
TRIGL SERPL-MCNC: 369 MG/DL
TSH SERPL-ACNC: 2.13 UIU/ML
URATE SERPL-MCNC: 3.2 MG/DL
WBC # FLD AUTO: 5.43 K/UL

## 2019-12-16 LAB — GLUCOSE SERPL-MCNC: 89 MG/DL

## 2020-02-07 RX ORDER — NIZATIDINE 150 MG/1
150 CAPSULE ORAL
Qty: 90 | Refills: 3 | Status: DISCONTINUED | COMMUNITY
Start: 2019-12-13 | End: 2020-02-07

## 2020-02-26 ENCOUNTER — APPOINTMENT (OUTPATIENT)
Dept: UROLOGY | Facility: CLINIC | Age: 70
End: 2020-02-26

## 2020-03-13 ENCOUNTER — APPOINTMENT (OUTPATIENT)
Dept: UROLOGY | Facility: CLINIC | Age: 70
End: 2020-03-13
Payer: MEDICARE

## 2020-03-13 VITALS
DIASTOLIC BLOOD PRESSURE: 81 MMHG | WEIGHT: 127 LBS | HEART RATE: 58 BPM | BODY MASS INDEX: 27.4 KG/M2 | SYSTOLIC BLOOD PRESSURE: 131 MMHG | HEIGHT: 57 IN

## 2020-03-13 LAB
BILIRUB UR QL STRIP: NORMAL
CLARITY UR: CLEAR
COLLECTION METHOD: NORMAL
GLUCOSE UR-MCNC: NORMAL
HCG UR QL: 0.2 EU/DL
HGB UR QL STRIP.AUTO: ABNORMAL
KETONES UR-MCNC: NORMAL
LEUKOCYTE ESTERASE UR QL STRIP: NORMAL
NITRITE UR QL STRIP: NORMAL
PH UR STRIP: 5.5
PROT UR STRIP-MCNC: NORMAL
SP GR UR STRIP: 1.01

## 2020-03-13 PROCEDURE — 99204 OFFICE O/P NEW MOD 45 MIN: CPT

## 2020-03-13 NOTE — REVIEW OF SYSTEMS
[Fever] : fever [Chills] : chills [Recent Weight Loss (___ Lbs)] : recent [unfilled] ~Ulb weight loss [Dry Eyes] : dryness of the eyes [Nasal Discharge] : nasal discharge [Sore Throat] : sore throat [Cough] : cough [Diarrhea] : diarrhea [Heartburn] : heartburn [see HPI] : see HPI [Painful [place]] : painful [place] [Genital yeast infection] : genital yeast infection [Date of last menstrual period ____] : date of last menstrual period: [unfilled] [Urine Infection (bladder/kidney)] : bladder/kidney infection [Pain during urination] : pain during urination [Wake up at night to urinate  How many times?  ___] : wakes up to urinate [unfilled] times during the night [Strong urge to urinate] : strong urge to urinate [Interrupted urine stream] : interrupted urine stream [Bladder fullness after urinating] : bladder fullness after urinating [Joint Pain] : joint pain [Dizziness] : dizziness [Hot Flashes] : hot flashes [Negative] : Heme/Lymph

## 2020-03-13 NOTE — HISTORY OF PRESENT ILLNESS
[FreeTextEntry1] : 70yo woman presents for re-evaluation of recurrent UTIs. She was last seen in 2015 for UTIs and states she was supposed to get cytoscopsy but did not follow up. Since that time, she reports a couple UTIs. About 1 month ago she saw her obgyn, Dr. Looney, for UTI and was given bactrim. Her symptoms were dysuria, incomplete emptying, urgency, frequency. No hematuria, fevers, chills, flank pain. She completed bactrim and also took azo for a few days, and her symptoms resolved for a few weeks. Last week she started having the same symptoms again. She has never had a febrile UTI. SHe also reports significant bother from vaginal dryness. She tried estrogen cream several years ago but did not like the application so she stopped, though she would be willing to try again. Currently pt having sx and concerned about UTI. UDip shows blood and LE but nitrite negative. \par \par She also reports a bothersome "bulge" in her vagina. She notes in when bathing and describes bother from this.

## 2020-03-13 NOTE — ASSESSMENT
[FreeTextEntry1] : 70yo woman presents with recurrent UTIs and mild cystocele.\par \par Plan:\par --UA/UCx\par --Vaginal estrogen cream sent to pharmacy for ppx\par --Renal-bladder u/s\par --RTC for in-office cytoscopy\par --EMpiric tx with macroibid\par \par POP with bother. Discussed prolapse management options including surgical, non-surgical (pessary) and observation. With bother, favor intervention. Pt favors intervention\par --refer to Dr Galloway\par --Cont estrace to decrease bother

## 2020-03-13 NOTE — PHYSICAL EXAM
[General Appearance - Well Developed] : well developed [General Appearance - Well Nourished] : well nourished [Normal Appearance] : normal appearance [Well Groomed] : well groomed [General Appearance - In No Acute Distress] : no acute distress [Edema] : no peripheral edema [Respiration, Rhythm And Depth] : normal respiratory rhythm and effort [Exaggerated Use Of Accessory Muscles For Inspiration] : no accessory muscle use [Abdomen Soft] : soft [Abdomen Tenderness] : non-tender [Costovertebral Angle Tenderness] : no ~M costovertebral angle tenderness [Normal Station and Gait] : the gait and station were normal for the patient's age [] : no rash [No Focal Deficits] : no focal deficits [Oriented To Time, Place, And Person] : oriented to person, place, and time [Affect] : the affect was normal [Mood] : the mood was normal [Not Anxious] : not anxious [No Palpable Adenopathy] : no palpable adenopathy [Urethral Meatus] : normal urethra [Urinary Bladder Findings] : the bladder was normal on palpation [External Female Genitalia] : normal external genitalia [Vagina] : normal vaginal exam [FreeTextEntry1] : mild cystocele, severe atrophy

## 2020-03-17 LAB
APPEARANCE: CLEAR
BACTERIA UR CULT: NORMAL
BACTERIA: NEGATIVE
BILIRUBIN URINE: NEGATIVE
BLOOD URINE: NEGATIVE
COLOR: NORMAL
GLUCOSE QUALITATIVE U: NEGATIVE
HYALINE CASTS: 1 /LPF
KETONES URINE: NEGATIVE
LEUKOCYTE ESTERASE URINE: ABNORMAL
MICROSCOPIC-UA: NORMAL
NITRITE URINE: NEGATIVE
PH URINE: 6
PROTEIN URINE: NEGATIVE
RED BLOOD CELLS URINE: 0 /HPF
SPECIFIC GRAVITY URINE: 1.02
SQUAMOUS EPITHELIAL CELLS: 1 /HPF
UROBILINOGEN URINE: NORMAL
WHITE BLOOD CELLS URINE: 3 /HPF

## 2020-03-20 ENCOUNTER — APPOINTMENT (OUTPATIENT)
Dept: UROLOGY | Facility: CLINIC | Age: 70
End: 2020-03-20

## 2020-03-30 ENCOUNTER — APPOINTMENT (OUTPATIENT)
Dept: UROLOGY | Facility: CLINIC | Age: 70
End: 2020-03-30

## 2020-04-02 ENCOUNTER — APPOINTMENT (OUTPATIENT)
Dept: UROLOGY | Facility: CLINIC | Age: 70
End: 2020-04-02

## 2020-05-06 ENCOUNTER — APPOINTMENT (OUTPATIENT)
Dept: INTERNAL MEDICINE | Facility: CLINIC | Age: 70
End: 2020-05-06
Payer: MEDICARE

## 2020-05-06 DIAGNOSIS — J30.9 ALLERGIC RHINITIS, UNSPECIFIED: ICD-10-CM

## 2020-05-06 PROCEDURE — 99442: CPT | Mod: CR

## 2020-05-06 RX ORDER — FAMOTIDINE 40 MG/1
40 TABLET, FILM COATED ORAL DAILY
Qty: 90 | Refills: 0 | Status: DISCONTINUED | COMMUNITY
Start: 2020-02-07 | End: 2020-05-06

## 2020-06-15 ENCOUNTER — APPOINTMENT (OUTPATIENT)
Dept: UROLOGY | Facility: CLINIC | Age: 70
End: 2020-06-15
Payer: MEDICARE

## 2020-06-15 ENCOUNTER — OUTPATIENT (OUTPATIENT)
Dept: OUTPATIENT SERVICES | Facility: HOSPITAL | Age: 70
LOS: 1 days | End: 2020-06-15
Payer: MEDICARE

## 2020-06-15 VITALS — TEMPERATURE: 97.6 F

## 2020-06-15 VITALS
SYSTOLIC BLOOD PRESSURE: 154 MMHG | HEART RATE: 64 BPM | RESPIRATION RATE: 17 BRPM | TEMPERATURE: 97 F | DIASTOLIC BLOOD PRESSURE: 83 MMHG

## 2020-06-15 DIAGNOSIS — R35.0 FREQUENCY OF MICTURITION: ICD-10-CM

## 2020-06-15 DIAGNOSIS — N39.0 URINARY TRACT INFECTION, SITE NOT SPECIFIED: ICD-10-CM

## 2020-06-15 DIAGNOSIS — Z98.890 OTHER SPECIFIED POSTPROCEDURAL STATES: Chronic | ICD-10-CM

## 2020-06-15 PROCEDURE — 52000 CYSTOURETHROSCOPY: CPT

## 2020-06-15 PROCEDURE — 76775 US EXAM ABDO BACK WALL LIM: CPT | Mod: 26

## 2020-06-15 PROCEDURE — 76775 US EXAM ABDO BACK WALL LIM: CPT

## 2020-06-22 DIAGNOSIS — N39.0 URINARY TRACT INFECTION, SITE NOT SPECIFIED: ICD-10-CM

## 2020-09-25 ENCOUNTER — TRANSCRIPTION ENCOUNTER (OUTPATIENT)
Age: 70
End: 2020-09-25

## 2020-12-07 ENCOUNTER — EMERGENCY (EMERGENCY)
Facility: HOSPITAL | Age: 70
LOS: 1 days | Discharge: HOME | End: 2020-12-07
Attending: EMERGENCY MEDICINE | Admitting: EMERGENCY MEDICINE
Payer: MEDICARE

## 2020-12-07 VITALS
SYSTOLIC BLOOD PRESSURE: 146 MMHG | OXYGEN SATURATION: 98 % | DIASTOLIC BLOOD PRESSURE: 71 MMHG | HEART RATE: 68 BPM | TEMPERATURE: 98 F

## 2020-12-07 VITALS
HEART RATE: 71 BPM | RESPIRATION RATE: 14 BRPM | WEIGHT: 126.1 LBS | HEIGHT: 58 IN | SYSTOLIC BLOOD PRESSURE: 162 MMHG | TEMPERATURE: 98 F | DIASTOLIC BLOOD PRESSURE: 81 MMHG | OXYGEN SATURATION: 99 %

## 2020-12-07 DIAGNOSIS — Z98.890 OTHER SPECIFIED POSTPROCEDURAL STATES: Chronic | ICD-10-CM

## 2020-12-07 PROCEDURE — 73562 X-RAY EXAM OF KNEE 3: CPT

## 2020-12-07 PROCEDURE — 70486 CT MAXILLOFACIAL W/O DYE: CPT

## 2020-12-07 PROCEDURE — 73130 X-RAY EXAM OF HAND: CPT | Mod: 26,LT

## 2020-12-07 PROCEDURE — 70486 CT MAXILLOFACIAL W/O DYE: CPT | Mod: 26

## 2020-12-07 PROCEDURE — 73562 X-RAY EXAM OF KNEE 3: CPT | Mod: 26,RT

## 2020-12-07 PROCEDURE — 99285 EMERGENCY DEPT VISIT HI MDM: CPT

## 2020-12-07 PROCEDURE — 99284 EMERGENCY DEPT VISIT MOD MDM: CPT | Mod: 25

## 2020-12-07 PROCEDURE — 73130 X-RAY EXAM OF HAND: CPT

## 2020-12-07 PROCEDURE — 70450 CT HEAD/BRAIN W/O DYE: CPT | Mod: 26

## 2020-12-07 PROCEDURE — 70450 CT HEAD/BRAIN W/O DYE: CPT

## 2020-12-07 RX ORDER — ACETAMINOPHEN 500 MG
650 TABLET ORAL ONCE
Refills: 0 | Status: COMPLETED | OUTPATIENT
Start: 2020-12-07 | End: 2020-12-07

## 2020-12-07 RX ADMIN — Medication 650 MILLIGRAM(S): at 17:59

## 2020-12-07 RX ADMIN — Medication 650 MILLIGRAM(S): at 17:51

## 2020-12-07 NOTE — ED PROVIDER NOTE - ENMT, MLM
Airway patent, Nasal mucosa clear. Mouth with normal mucosa. No intraoral injuries noted. +L periorbital ecchymosis and swelling noted, skin intact. Nasal bones NT, no other OMF injuries noted.

## 2020-12-07 NOTE — ED PROVIDER NOTE - NSFOLLOWUPINSTRUCTIONS_ED_ALL_ED_FT
Follow up with your PMD in 1-2 days for re-evaluation, ongoing care and treatment. Rest, weight bearing as tolerated, ice compresses to site of swelling/pain for 20 minutes 3-4 times daily the first 24 hours, tylenol 650mg every 6 hours as needed for pain. If having worsening of symptoms or other related symptoms, RETURN TO THE ER IMMEDIATELY.     Facial Contusion    WHAT YOU NEED TO KNOW:    A facial contusion is a bruise that appears on your face after an injury. A bruise happens when small blood vessels tear but skin does not. When blood vessels tear, blood leaks into nearby tissue, such as soft tissue or muscle. You may develop swelling and bruising around your eyes if your bruise is on your brow, forehead, or the bridge of your nose.     DISCHARGE INSTRUCTIONS:    Return to the emergency department if:   •You have a fever.      •You have watery, clear fluid draining from your nose.       •You have changes in your vision or eye appearance.      •You have changes or pain with eye movement.      •You have tingling or numbness in or near the injured area.      Contact your healthcare provider if:   •You find a new lump in the injured area.      •Your symptoms do not improve with treatment.      •You have questions or concerns about your condition or care.      Medicines:   •Acetaminophen decreases pain. It is available without a doctor's order. Ask how much to take and how often to take it. Follow directions. Acetaminophen can cause liver damage if not taken correctly.      •Take your medicine as directed. Contact your healthcare provider if you think your medicine is not helping or if you have side effects. Tell him of her if you are allergic to any medicine. Keep a list of the medicines, vitamins, and herbs you take. Include the amounts, and when and why you take them. Bring the list or the pill bottles to follow-up visits. Carry your medicine list with you in case of an emergency.      Ice: Apply ice on your bruise for 15 to 20 minutes every hour or as directed. Use an ice pack, or put crushed ice in a plastic bag. Cover it with a towel. Ice helps prevent tissue damage and decreases swelling and pain.    Elevation: Sleep with your head elevated to help decrease swelling.     Help your contusion heal: Do not massage the area or put heating pads or other warming devices on the bruise right after your injury. Heat and massage may slow the healing of the area.    Follow up with your healthcare provider as directed: You may need to return within a week to have your injury checked again. Write down any questions you have so you remember to ask them in your follow-up visits.    Prevent a facial contusion:   •Use safety belts and child restraints.       •Use safety helmets when you ride a bicycle or motorcycle.       •Use a mouth and face guard during sports.

## 2020-12-07 NOTE — ED PROVIDER NOTE - CPE EDP SKIN NORM
Speech Therapy Daily Treatment    Visit Count: 12  Plan of Care Dates: Initial: 6/24/2019 Through: 9/16/2019  Insurance Information:   Therapy Benefits:     Payor: [employer]  Authorization Needed: No  Visit Limit: Based on medical necessity  CoPay: $40  Notes:  Follow Medicare guidelines  Call Ref #:      “Evaluation requires MD, NP, PA, or DO co-signature”     Hyper link to: insurance WIKI     This quote of benefits is not a guarantee of payment  Next Referring Provider Visit: not scheduled in [facility]     Referred by: Dr. Willie Maxwell DO  Medical Diagnosis (from order):  332.0 (ICD-9-CM) - G20 (ICD-10-CM) - Parkinson disease (CMS/[facility])  Treatment Diagnosis: Cognitive Communication Deficit,  Dysarthria  Insurance: 1. [employer]  2. N/A     Date of Onset/Injury: Diagnosed ~ 1 month prior  Precautions: Hearing Impaired, melanoma left cheek (removed)  Relevant History/Medications: See Neuro Rehabilitation Intake Form  Relevant Tests: none reported by patient     Medical/surgical history, medications and relevant tests have been reviewed.        SUBJECTIVE:    Presents with brotherAdam. Pt pleasant and ready for therapy.     Pain: average: 0/10    OBJECTIVE:    HEP = Home Exercise Program   MPT=Maximum Phonation Time  dB SPL=decibels per sound pressure level    Treatment   Treatment of speech for Dysarthria: Pt is receiving SPEAK OUT! Live with Intent in conjunction with [facility] [employer]. SPEAK OUT protocol is being followed.  The primary goals of therapy are to strengthen the muscles used for speaking and swallowing and to teach patients how to speak with intent and deliberation.    Training and instruction in:    Vocal Intensity: min cues and models for effort/intent for all but mod for vowel prolongation.    Speech intelligibility rating at:   Conversation Level: 90 %, with min cues    Device Used for Measurements: Sound Pressure Level (SPL) Meter    SPEAK OUT! Exercises  Multiple repetitions are  completed each session per protocol.    Date Conversation  (no cues) beginning of therapy. Target dB 72 Vocal warm-up  Target dB 85.   % at target Cue level Vowel ProlongationTarget dB 85  % at target  Cue level Pitch Glides  Target dB 80  % at target Cue Level   7/15/19 40% 10% MAX 50% at target dB, 0% for 10 second hold MAX 30% MAX   7/17/19 60% 30% MAX 50% at target dB, 0% for 10 second hold MAX 50% MAX   7/19/19 70% 70% MOD 50% at target dB, 0% for 10 second hold MAX 50% MAX   7/22/19 60%  70% MOD 70% at target dB, 0% for 10 second hold MAX 50% MAX   7/24/19 80% 90% MOD 80% at target dB, 0% for 10 second hold MOD 50% MOD   7/26/19 80% 90% MOD 80% at target dB, 0% 10 sec MOD 70% MOD   7/29/19 80% 80% MOD 60% at target dB, 0% 10 sec max 90% mod   7/31/19 80% 80% MOD 70% at target dB, 0% @ 10 sec MAX 80% MAX   8/2/19 80% 90% MIN 70% at target dB, 0% @ 10 sec MOD 90% MOD   8/6/19 80% 90% MIN 70% at target dB, 0% @ 10 sec MOD 90% MOD   8/7/19 80% 90% MIN 80% at target dB, 0% @ 10 sec MOD 90% MOD   8/9/19          Comments: Mod models/cues to keep head up and open mouth WIDE when completing glides. Pt with better glide using stair step method. Average sustained /ah/ 4-5 seconds today.     SPEAK OUT! Numerical Sequence, Reading, and Cognitive exercises:    Date Numerical Sequence  Target dB 80, % at target. Cue level Reading Exercises  Target dB 78, % at target. Cue level Cognitive Exercises  Target dB 75, % at target. Cue level Cognitive Exercises  Target dB 72, % at target.   7/15/19 50% MAX Phrase level 70% MAX 50%, simple MAX 30%, complex   7/17/19 80% MOD Phrase level 90% MOD 80%, simple MOD 50%, complex   7/19/19 90% MIN Sentence level 90% MIN 90%, simple MOD 60%, complex   7/22/19 90% MIN Sentence level 90% MIN 90%, simple  MOD 60%, complex   7/24/19 90% MIN Sentence level 90% MIN 90%, simple MIN 80%, complex   7/26/19 90% MIN Multi-sentencelevel 90% MIN 90%, simple MIN 70%, complex   7/29/19 80% min paragraph  level 100% min 100%, simple min 90%, Complex    7/31/19 90% MIN Paragraph level 90% %, simple MIN 90% Complex   8/2/19 90% MIN Short story level 100% MIN 90%, simple MIN 90%, complex   8/6/19 100% NONE Short story level 100% NONE 90% simple NONE 90%, complex   8/7/19 100% NONE Short story level 100% NONE 90% simple MIN 90% complex   8/9/`9              Comments: describing pros/cons and multiple meaning words with rolling ball for cognitive exercises      Current Home Program (not performed this date except as noted above):  Exercises: Dysarthria Exercises  Cognitive Exercises    ASSESSMENT:    At initial evaluation: 74 year old male presents to therapy with significant decline in prior level of function and is below functional baseline 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    Today's Treatment: Skilled instruction, facilitation/stimulation exercises and compensatory strategies were focus of session for dysarthria treatment. Full lesson of SPEAK OUT! Live With Intent completed within the session. Pt demonstrated good use of intent during structured speaking exercises with minimal to no cues. He sustained goal level intensity as measured by sound level meter up to 100% of the time during speaking exercises. Cueing and modeling was increased again for vocal warm ups (I.e. Sustained /ah/ and glides) to mod cue level as these exercises were more difficult for pt. He benefited from cues for head up, open mouth posturing to improve his vocal intensity. Sustained /ah/ at 4-5 seconds on average. Carryover of intentional voice progressing outside of treatment room as well as with increased cognitive demand. Pt very compliant with HEP. Pt able to teach and model concept of intent to his brother. Family teaching provided for carryover of skills.   See above for objective data  Skilled therapy required to remediate identified deficits to return patient to baseline function  at home and in the community with facilitation, stimulation and compensatory strategies.      Result of above outlined education: Verbalizes understanding    Goals:   To be obtained by end of this plan of care:  1. Patient will increase vocal loudness to reach a target sound pressure level (SPL) of 85 dB with min cues during sustained phonation, from 69 dB at time of initial evaluation, which will help patient to increase vocal respiratory support required for functional communication at home and in the community. STATUS: goal progressing, met at mod cue level  2. Patient will increase vocal loudness to reach a target SPL of 80 dB with min cues during reading at the word/sentence/paragraph level from 63 dB at time of initial evlaution which will help patient to increase vocal respiratory support required for functional communication at home and in the community. STATUS: goal met, continue to monitor to completion  3. Patient will increase vocal loudness to reach a target SPL of 75 dB with min cues during simple conversational speech, from 63 dB at time of initial evaluation, which will help patient to increase vocal respiratory support required for effective, functional communication at home and in the community. STATUS: goal progressing, current average of 72 dB  4.  Pt will improve vocal endurance and intensity for functional communication skills as evidenced by maintaining 72 dB level in complex conversation for >10 minutes STATUS: goal progressing, current needs min cues to sustain  5. Pt will complete all assigned HEP between ST sessions at 90% accuracy to maximize therapy progress and functional gains STATUS: goal met, continue to monitor to completion    PLAN:    Next session: Lesson 25 and discharge    THERAPY DAILY BILLING:    Primary Insurance: [employer]  Secondary Insurance: N/A    Evaluation Procedures:  No evaluation codes were used on this date of service    Treatment  Procedures:  Treatment of Speech Language    Total Treatment Time: 60 minutes      The referring provider's electronic or written signature on the evaluation authorizes the therapy plan of care and certifies the need for these services, furnished under this plan of care while under their care.  Referring provider signature on file   normal...

## 2020-12-07 NOTE — ED PROVIDER NOTE - UPPER EXTREMITY EXAM, LEFT
+mild ttp with mild swelling and bruising noted to anterior R knee with FROM, skin intact, toes warm & mobile, pulses and sensation intact/TENDERNESS

## 2020-12-07 NOTE — ED PROVIDER NOTE - PATIENT PORTAL LINK FT
You can access the FollowMyHealth Patient Portal offered by [facility] by registering at the following website: [email or web address]. By joining [facility]’s FollowMyHealth portal, you will also be able to view your health information using other applications (apps) compatible with our system.

## 2020-12-07 NOTE — ED PROVIDER NOTE - OBJECTIVE STATEMENT
69 y/o F with hx of anemia, GERD, DIOR presents with c/o L 69 y/o F with hx of anemia, GERD, DIOR presents with c/o L facial pain sp fall x today. Pt states that she was walking her dog, tripped on sidewalk and fell forward hitting her face/head. States that she has pain to her L hand and B knees (R>L) and bruising with pain below L eye. Denies LOC, use of blood thinners, headache, dizziness, eye pain, vision changes, numbness, tingling, neck/back/hip pain, open wounds, CP, SOB, abdominal pain or other symptoms/injuries. Pt is RHD.

## 2020-12-07 NOTE — ED PROVIDER NOTE - PROGRESS NOTE DETAILS
Scribe IN for Dr. Dunne: 71 y/o female with PMHx of Anemia, GERD, Bronchitis, DIOR presents to the ED s/p mechanical fall. Pt states she was walking outside, tripped and fell, with associated hit to head. Pt currently c/o pain to left hand, bilateral knees, worse on left, and left side of face udner eye. Denies LOC. Pt states she didn't bite her tongue. Denies neck pain. Pt takes 81 ASA every other day. No other injuries or complaints at this time. NKDA. PCP: Grant Hart.  PE: vital signs normal, afebrile, NAD. Left periorbital contusion, no bony deformity. PERRL, EOMI. Neck is supple, non-tender. Left hand with minor tenderness. No deformity, FROM intact. Minor contusion to right knee. No deformity, FROM intact.  PLAN: CT brain and facial bones. X-ray hand and knee. Pain control, Ortho f/u. Reevaluated patient at bedside.  Patient feeling much improved.  Discussed the results of all diagnostic testing in ED and copies of all reports given.   An opportunity to ask questions was given.  Discussed the importance of prompt, close medical follow-up.  Patient will return with any changes, concerns or persistent / worsening symptoms.  Understanding of all instructions verbalized. Reevaluated patient at bedside.  Patient feeling much improved.  Discussed the results of all diagnostic testing in ED and copies of all reports given. Advised RICE, WBAT, tylenol for pain, f/u pcp.  An opportunity to ask questions was given.  Discussed the importance of prompt, close medical follow-up.  Patient will return with any changes, concerns or persistent / worsening symptoms.  Understanding of all instructions verbalized.

## 2020-12-07 NOTE — ED PROVIDER NOTE - CARE PLAN
Principal Discharge DX:	Facial contusion, initial encounter  Secondary Diagnosis:	Hand injury, left, initial encounter  Secondary Diagnosis:	Knee contusion  Secondary Diagnosis:	Fall

## 2020-12-07 NOTE — ED ADULT NURSE REASSESSMENT NOTE - NS ED NURSE REASSESS COMMENT FT1
received report and assumed care of pt at change of shift. pt awake alert. NAD. ambulated unassisted. d/c to self. verbalized understanding of d/c instruction

## 2020-12-07 NOTE — ED PROVIDER NOTE - EXTREMITY EXAM
right lower extremity findings/L knee NT with FROM, no erythema or swelling noted, NVI/left upper extremity findings

## 2020-12-07 NOTE — ED PROVIDER NOTE - PMH
Anemia    Bronchitis  May 2008  Deviated Nasal Septum    GERD (Gastroesophageal Reflux Disease)    Heart Murmur  Echo done 10/2009  Asymptomatic :  No CP, no  palpitations, no dizziness  Herniated Disc  Lumbar  Obstructive Sleep Apnea  s/p septoplasty, turbinectomy, Uvulopalatopharyngoplasty in 2009 - "no CPAP needed after surgery"

## 2020-12-07 NOTE — ED PROVIDER NOTE - LOWER EXTREMITY EXAM, RIGHT
+mild ttp L hand dorsal aspect, skin intact, no erythema or swelling noted, fingers warm & mobile, able to make a fist, cap refill<2sec, pulses and sensation intact, no snuffbox tenderness, NVI/TENDERNESS

## 2020-12-07 NOTE — ED PROVIDER NOTE - CHPI ED SYMPTOMS NEG
no loss of consciousness/no fever/no numbness/no abrasion/no vomiting/no tingling/no weakness/no confusion

## 2020-12-22 ENCOUNTER — APPOINTMENT (OUTPATIENT)
Dept: INTERNAL MEDICINE | Facility: CLINIC | Age: 70
End: 2020-12-22
Payer: MEDICARE

## 2020-12-22 ENCOUNTER — NON-APPOINTMENT (OUTPATIENT)
Age: 70
End: 2020-12-22
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
## 2020-12-22 PROCEDURE — 99214 OFFICE O/P EST MOD 30 MIN: CPT | Mod: 95

## 2020-12-22 NOTE — PHYSICAL EXAM
[Normal] : affect was normal and insight and judgment were intact [de-identified] : Decreasing ecchymosis mostly under the left eye [de-identified] : No obvious neurologic defects over the video

## 2020-12-22 NOTE — HISTORY OF PRESENT ILLNESS
[FreeTextEntry8] : This is a 70-year-old concerned female who had a fall approximately 2 weeks ago the nature of the fall is somewhat indistinct.  She states she did not lose consciousness or trip she really cannot describe why she fell.  She does feel that she was moving her glasses and fixing her mask when she lost a step which is probably true.  She did hit her hand as well as her face so I am a bit less concerned.  She has had no further episodes of ataxia.  Or missteps\par \par She has persistent ecchymosis over her eye but no change in vision she has no headaches nausea\par \par We did review all of her x-rays including the CT of the head which was negative\par \par Most importantly to her was the fact that the x-rays of the face and head showed no fractures\par \par I told her to continue with warm soaks and to call me in 2 weeks.  To call me immediately if she has any balance issues or dizziness\par \par She was bright alert appropriate and demonstrated no focal neurologic signs

## 2021-05-16 ENCOUNTER — TRANSCRIPTION ENCOUNTER (OUTPATIENT)
Age: 71
End: 2021-05-16

## 2021-05-18 ENCOUNTER — APPOINTMENT (OUTPATIENT)
Dept: INTERNAL MEDICINE | Facility: CLINIC | Age: 71
End: 2021-05-18
Payer: MEDICARE

## 2021-05-18 VITALS — HEIGHT: 57.75 IN | BODY MASS INDEX: 27.71 KG/M2 | WEIGHT: 132 LBS | HEART RATE: 66 BPM | TEMPERATURE: 98 F

## 2021-05-18 DIAGNOSIS — M54.5 LOW BACK PAIN: ICD-10-CM

## 2021-05-18 PROCEDURE — 99213 OFFICE O/P EST LOW 20 MIN: CPT

## 2021-05-18 NOTE — ASSESSMENT
[FreeTextEntry1] : This is a 70-year-old female whose history has been reviewed above\par \par I do not think this is sciatic she has exquisite point tenderness above the hip and pain on rotation.\par \par I told her to take ibuprofen 2 pills up to 3 times a day along with her PPI.  She will call me in about 48 hours to let me know how she is doing\par \par If the pain persists she may need a injection or a steroid pack

## 2021-05-18 NOTE — HISTORY OF PRESENT ILLNESS
[FreeTextEntry8] : This is a 70-year-old old female who presented approximately 2 days ago [facility] pain which she states came on suddenly.  She initially states that it went down her right leg a bit.  However this has dissipated today in point of fact she is feeling somewhat better today but still has difficulty with walking.\par \par She can point specifically to the area of pain

## 2021-05-18 NOTE — PHYSICAL EXAM
[Normal] : no acute distress, well nourished, well developed and well-appearing [de-identified] : Point pain right hip pain on rotation

## 2021-06-13 ENCOUNTER — TRANSCRIPTION ENCOUNTER (OUTPATIENT)
Age: 71
End: 2021-06-13

## 2021-07-25 ENCOUNTER — RX RENEWAL (OUTPATIENT)
Age: 71
End: 2021-07-25

## 2021-08-27 ENCOUNTER — LABORATORY RESULT (OUTPATIENT)
Age: 71
End: 2021-08-27

## 2021-08-27 ENCOUNTER — APPOINTMENT (OUTPATIENT)
Dept: INTERNAL MEDICINE | Facility: CLINIC | Age: 71
End: 2021-08-27
Payer: MEDICARE

## 2021-08-27 ENCOUNTER — NON-APPOINTMENT (OUTPATIENT)
Age: 71
End: 2021-08-27

## 2021-08-27 VITALS
BODY MASS INDEX: 27.71 KG/M2 | DIASTOLIC BLOOD PRESSURE: 80 MMHG | SYSTOLIC BLOOD PRESSURE: 124 MMHG | WEIGHT: 132 LBS | HEIGHT: 57.75 IN

## 2021-08-27 PROCEDURE — G0439: CPT

## 2021-08-27 PROCEDURE — 93000 ELECTROCARDIOGRAM COMPLETE: CPT

## 2021-08-27 PROCEDURE — 99214 OFFICE O/P EST MOD 30 MIN: CPT | Mod: 25

## 2021-08-27 PROCEDURE — 36415 COLL VENOUS BLD VENIPUNCTURE: CPT

## 2021-08-27 NOTE — ASSESSMENT
[FreeTextEntry1] : This is a 70-year-old female whose history has been reviewed above\par \par He had a history of sleep apnea in the past she had corrective surgery she states she no longer has symptoms she has mild snoring but no fatigue and states that she was retested in the past and has no need for intervention\par \par She does state that she had Bell's palsy she was seen by neurology at that time there was some mention of some stenosis.  However I have no confirmation I will do a repeat MRI (MRI of 2017 showed a questionable ulcer or abnormality of her carotids she remains on aspirin.\par \par She does have mildly elevated cholesterol a profile has been obtained if the duplex is positive and her cholesterol is not concordant I will start her on a statin he is on aspirin\par \par She is having some insomnia I told her to start melatonin she will begin this in a graded fashion\par \par It is interesting that she has had labile hypertension in the past but her blood pressure is normal today she has no orthostasis no medication changes or institution\par \par She does have a bladder prolapse she has not proceeded but she does have a ill feeling in the vaginal she will follow up with urology.  Dysuria has improved on estradiol cream\par \par She is up-to-date with colonoscopy is due for mammography she is up-to-date with OB/GYN she is due for a bone density I told her to get the booster shot and subsequent shingles

## 2021-08-27 NOTE — HISTORY OF PRESENT ILLNESS
[FreeTextEntry1] : Is a 69-year-old female for annual health assessment.  Specifically we will address her history of an elevated cholesterol asthma sleep apnea [de-identified] : Overall she is feeling well she does suffer from insomnia.  Her anxiety levels are a bit elevated because of the pandemic\par \par She occasionally uses a PPI for reflux on direct questioning she states she snores but much less she has no fatigue\par \par Her dysuria has improved but she still has a feeling of a object in her vagina

## 2021-08-27 NOTE — REVIEW OF SYSTEMS
[Insomnia] : insomnia [Anxiety] : anxiety [Negative] : Heme/Lymph [FreeTextEntry8] : Abnormal l feeling in the vagina

## 2021-08-27 NOTE — HEALTH RISK ASSESSMENT
[Good] : ~his/her~ current health as good [Very Good] : ~his/her~  mood as very good [Yes] : Yes [No falls in past year] : Patient reported no falls in the past year [0] : 1) Little interest or pleasure doing things: Not at all (0) [None] : None [With Significant Other] : lives with significant other [Employed] : employed [Feels Safe at Home] : Feels safe at home [Fully functional (using the telephone, shopping, preparing meals, housekeeping, doing laundry, using] : Fully functional and needs no help or supervision to perform IADLs (using the telephone, shopping, preparing meals, housekeeping, doing laundry, using transportation, managing medications and managing finances) [Smoke Detector] : smoke detector [Carbon Monoxide Detector] : carbon monoxide detector [Seat Belt] :  uses seat belt [Sunscreen] : uses sunscreen [I will adhere to the patient's wishes.] : I will adhere to the patient's wishes. [] : No [Change in mental status noted] : No change in mental status noted [Retired] : retired [] :  [Sexually Active] : not sexually active [Fully functional (bathing, dressing, toileting, transferring, walking, feeding)] : Fully functional (bathing, dressing, toileting, transferring, walking, feeding) [Reports changes in hearing] : Reports no changes in hearing [Reports changes in vision] : Reports no changes in vision [Reports changes in dental health] : Reports no changes in dental health [Guns at Home] : no guns at home [Safety elements used in home] : no safety elements used in home [Travel to Developing Areas] : does not  travel to developing areas [TB Exposure] : is not being exposed to tuberculosis [Caregiver Concerns] : does not have caregiver concerns [FreeTextEntry4] :  is healthcare proxy

## 2021-08-28 LAB
25(OH)D3 SERPL-MCNC: 35.4 NG/ML
ALBUMIN SERPL ELPH-MCNC: 4.2 G/DL
ALP BLD-CCNC: 78 U/L
ALT SERPL-CCNC: 16 U/L
ANION GAP SERPL CALC-SCNC: 8 MMOL/L
APPEARANCE: CLEAR
AST SERPL-CCNC: 21 U/L
BASOPHILS # BLD AUTO: 0.03 K/UL
BASOPHILS NFR BLD AUTO: 0.5 %
BILIRUB SERPL-MCNC: 0.3 MG/DL
BILIRUBIN URINE: NEGATIVE
BLOOD URINE: NEGATIVE
BUN SERPL-MCNC: 18 MG/DL
CALCIUM SERPL-MCNC: 9.2 MG/DL
CHLORIDE SERPL-SCNC: 108 MMOL/L
CHOLEST SERPL-MCNC: 194 MG/DL
CO2 SERPL-SCNC: 26 MMOL/L
COLOR: NORMAL
CREAT SERPL-MCNC: 0.93 MG/DL
EOSINOPHIL # BLD AUTO: 0.15 K/UL
EOSINOPHIL NFR BLD AUTO: 2.5 %
ESTIMATED AVERAGE GLUCOSE: 114 MG/DL
GLUCOSE QUALITATIVE U: NEGATIVE
GLUCOSE SERPL-MCNC: 88 MG/DL
HBA1C MFR BLD HPLC: 5.6 %
HCT VFR BLD CALC: 37.4 %
HDLC SERPL-MCNC: 32 MG/DL
HGB BLD-MCNC: 11.3 G/DL
IMM GRANULOCYTES NFR BLD AUTO: 0.2 %
KETONES URINE: NEGATIVE
LDLC SERPL CALC-MCNC: 106 MG/DL
LEUKOCYTE ESTERASE URINE: NEGATIVE
LYMPHOCYTES # BLD AUTO: 1.64 K/UL
LYMPHOCYTES NFR BLD AUTO: 27.8 %
MAN DIFF?: NORMAL
MCHC RBC-ENTMCNC: 29.4 PG
MCHC RBC-ENTMCNC: 30.2 GM/DL
MCV RBC AUTO: 97.1 FL
MONOCYTES # BLD AUTO: 0.62 K/UL
MONOCYTES NFR BLD AUTO: 10.5 %
NEUTROPHILS # BLD AUTO: 3.44 K/UL
NEUTROPHILS NFR BLD AUTO: 58.5 %
NITRITE URINE: NEGATIVE
NONHDLC SERPL-MCNC: 163 MG/DL
PH URINE: 5.5
PLATELET # BLD AUTO: 202 K/UL
POTASSIUM SERPL-SCNC: 4.5 MMOL/L
PROT SERPL-MCNC: 6.6 G/DL
PROTEIN URINE: NEGATIVE
RBC # BLD: 3.85 M/UL
RBC # FLD: 13.8 %
SODIUM SERPL-SCNC: 143 MMOL/L
SPECIFIC GRAVITY URINE: 1.02
T3RU NFR SERPL: 1 TBI
T4 SERPL-MCNC: 5.1 UG/DL
TRIGL SERPL-MCNC: 280 MG/DL
TSH SERPL-ACNC: 2.39 UIU/ML
URATE SERPL-MCNC: 4.1 MG/DL
UROBILINOGEN URINE: NORMAL
WBC # FLD AUTO: 5.89 K/UL

## 2021-08-31 LAB
FERRITIN SERPL-MCNC: 83 NG/ML
FOLATE SERPL-MCNC: 8.4 NG/ML
IRON SATN MFR SERPL: 28 %
IRON SERPL-MCNC: 79 UG/DL
TIBC SERPL-MCNC: 282 UG/DL
UIBC SERPL-MCNC: 203 UG/DL
VIT B12 SERPL-MCNC: 478 PG/ML

## 2021-09-01 LAB — STFR SERPL-MCNC: 13.6 NMOL/L

## 2021-09-08 LAB
ALBUMIN MFR SERPL ELPH: 61.9 %
ALBUMIN SERPL-MCNC: 4.1 G/DL
ALBUMIN/GLOB SERPL: 1.6 RATIO
ALPHA1 GLOB MFR SERPL ELPH: 3.2 %
ALPHA1 GLOB SERPL ELPH-MCNC: 0.2 G/DL
ALPHA2 GLOB MFR SERPL ELPH: 8.7 %
ALPHA2 GLOB SERPL ELPH-MCNC: 0.6 G/DL
B-GLOBULIN MFR SERPL ELPH: 10.8 %
B-GLOBULIN SERPL ELPH-MCNC: 0.7 G/DL
GAMMA GLOB FLD ELPH-MCNC: 1 G/DL
GAMMA GLOB MFR SERPL ELPH: 15.4 %
INTERPRETATION SERPL IEP-IMP: NORMAL
M PROTEIN SPEC IFE-MCNC: NORMAL
PROT SERPL-MCNC: 6.7 G/DL
PROT SERPL-MCNC: 6.7 G/DL

## 2021-09-20 ENCOUNTER — APPOINTMENT (OUTPATIENT)
Dept: ULTRASOUND IMAGING | Facility: IMAGING CENTER | Age: 71
End: 2021-09-20
Payer: MEDICARE

## 2021-09-20 ENCOUNTER — APPOINTMENT (OUTPATIENT)
Dept: MAMMOGRAPHY | Facility: IMAGING CENTER | Age: 71
End: 2021-09-20
Payer: MEDICARE

## 2021-09-20 ENCOUNTER — OUTPATIENT (OUTPATIENT)
Dept: OUTPATIENT SERVICES | Facility: HOSPITAL | Age: 71
LOS: 1 days | End: 2021-09-20
Payer: MEDICARE

## 2021-09-20 ENCOUNTER — RESULT REVIEW (OUTPATIENT)
Age: 71
End: 2021-09-20

## 2021-09-20 DIAGNOSIS — Z00.8 ENCOUNTER FOR OTHER GENERAL EXAMINATION: ICD-10-CM

## 2021-09-20 DIAGNOSIS — Z98.890 OTHER SPECIFIED POSTPROCEDURAL STATES: Chronic | ICD-10-CM

## 2021-09-20 PROCEDURE — 76641 ULTRASOUND BREAST COMPLETE: CPT | Mod: 26,50

## 2021-09-20 PROCEDURE — 77063 BREAST TOMOSYNTHESIS BI: CPT | Mod: 26

## 2021-09-20 PROCEDURE — 77067 SCR MAMMO BI INCL CAD: CPT | Mod: 26

## 2021-09-20 PROCEDURE — 76641 ULTRASOUND BREAST COMPLETE: CPT

## 2021-09-20 PROCEDURE — 77063 BREAST TOMOSYNTHESIS BI: CPT

## 2021-09-20 PROCEDURE — 77067 SCR MAMMO BI INCL CAD: CPT

## 2021-09-23 ENCOUNTER — APPOINTMENT (OUTPATIENT)
Dept: INTERNAL MEDICINE | Facility: CLINIC | Age: 71
End: 2021-09-23
Payer: MEDICARE

## 2021-09-23 PROCEDURE — 77080 DXA BONE DENSITY AXIAL: CPT

## 2021-10-03 ENCOUNTER — APPOINTMENT (OUTPATIENT)
Dept: MRI IMAGING | Facility: IMAGING CENTER | Age: 71
End: 2021-10-03
Payer: MEDICARE

## 2021-10-03 ENCOUNTER — OUTPATIENT (OUTPATIENT)
Dept: OUTPATIENT SERVICES | Facility: HOSPITAL | Age: 71
LOS: 1 days | End: 2021-10-03
Payer: MEDICARE

## 2021-10-03 DIAGNOSIS — Z98.890 OTHER SPECIFIED POSTPROCEDURAL STATES: Chronic | ICD-10-CM

## 2021-10-03 DIAGNOSIS — I65.29 OCCLUSION AND STENOSIS OF UNSPECIFIED CAROTID ARTERY: ICD-10-CM

## 2021-10-03 PROCEDURE — 70547 MR ANGIOGRAPHY NECK W/O DYE: CPT

## 2021-10-03 PROCEDURE — 70547 MR ANGIOGRAPHY NECK W/O DYE: CPT | Mod: 26,MH

## 2021-10-06 ENCOUNTER — APPOINTMENT (OUTPATIENT)
Dept: INTERNAL MEDICINE | Facility: CLINIC | Age: 71
End: 2021-10-06
Payer: MEDICARE

## 2021-10-06 DIAGNOSIS — Z23 ENCOUNTER FOR IMMUNIZATION: ICD-10-CM

## 2021-10-06 PROCEDURE — 90662 IIV NO PRSV INCREASED AG IM: CPT

## 2021-10-06 PROCEDURE — G0008: CPT

## 2021-11-03 ENCOUNTER — RX RENEWAL (OUTPATIENT)
Age: 71
End: 2021-11-03

## 2021-12-30 DIAGNOSIS — Z11.59 ENCOUNTER FOR SCREENING FOR OTHER VIRAL DISEASES: ICD-10-CM

## 2022-01-01 LAB — SARS-COV-2 N GENE NPH QL NAA+PROBE: NOT DETECTED

## 2022-04-11 PROBLEM — Z11.59 SCREENING FOR VIRAL DISEASE: Status: ACTIVE | Noted: 2021-12-30

## 2022-07-07 LAB — SARS-COV-2 N GENE NPH QL NAA+PROBE: NOT DETECTED

## 2022-10-17 ENCOUNTER — LABORATORY RESULT (OUTPATIENT)
Age: 72
End: 2022-10-17

## 2022-10-17 ENCOUNTER — APPOINTMENT (OUTPATIENT)
Dept: INTERNAL MEDICINE | Facility: CLINIC | Age: 72
End: 2022-10-17

## 2022-10-17 ENCOUNTER — NON-APPOINTMENT (OUTPATIENT)
Age: 72
End: 2022-10-17

## 2022-10-17 VITALS
HEIGHT: 57.09 IN | SYSTOLIC BLOOD PRESSURE: 158 MMHG | DIASTOLIC BLOOD PRESSURE: 80 MMHG | BODY MASS INDEX: 27.24 KG/M2 | WEIGHT: 126.25 LBS

## 2022-10-17 DIAGNOSIS — Z86.69 PERSONAL HISTORY OF OTHER DISEASES OF THE NERVOUS SYSTEM AND SENSE ORGANS: ICD-10-CM

## 2022-10-17 DIAGNOSIS — Z00.00 ENCOUNTER FOR GENERAL ADULT MEDICAL EXAMINATION W/OUT ABNORMAL FINDINGS: ICD-10-CM

## 2022-10-17 DIAGNOSIS — M85.80 OTHER SPECIFIED DISORDERS OF BONE DENSITY AND STRUCTURE, UNSPECIFIED SITE: ICD-10-CM

## 2022-10-17 PROCEDURE — 90662 IIV NO PRSV INCREASED AG IM: CPT

## 2022-10-17 PROCEDURE — 36415 COLL VENOUS BLD VENIPUNCTURE: CPT

## 2022-10-17 PROCEDURE — G0439: CPT

## 2022-10-17 PROCEDURE — 99214 OFFICE O/P EST MOD 30 MIN: CPT | Mod: 25

## 2022-10-17 PROCEDURE — G0008: CPT

## 2022-10-17 PROCEDURE — 93000 ELECTROCARDIOGRAM COMPLETE: CPT | Mod: 59

## 2022-10-17 RX ORDER — ESTRADIOL 0.1 MG/G
0.1 CREAM VAGINAL
Qty: 1 | Refills: 5 | Status: DISCONTINUED | COMMUNITY
Start: 2020-03-13 | End: 2022-10-17

## 2022-10-17 NOTE — HEALTH RISK ASSESSMENT
[Good] : ~his/her~  mood as  good [Never] : Never [Yes] : Yes [No falls in past year] : Patient reported no falls in the past year [0] : 2) Feeling down, depressed, or hopeless: Not at all (0) [PHQ-2 Negative - No further assessment needed] : PHQ-2 Negative - No further assessment needed [None] : None [Retired] : retired [] :  [Feels Safe at Home] : Feels safe at home [Fully functional (bathing, dressing, toileting, transferring, walking, feeding)] : Fully functional (bathing, dressing, toileting, transferring, walking, feeding) [Fully functional (using the telephone, shopping, preparing meals, housekeeping, doing laundry, using] : Fully functional and needs no help or supervision to perform IADLs (using the telephone, shopping, preparing meals, housekeeping, doing laundry, using transportation, managing medications and managing finances) [Smoke Detector] : smoke detector [Carbon Monoxide Detector] : carbon monoxide detector [Seat Belt] :  uses seat belt [Sunscreen] : uses sunscreen [I will adhere to the patient's wishes.] : I will adhere to the patient's wishes. [Change in mental status noted] : No change in mental status noted [Sexually Active] : not sexually active [Reports changes in hearing] : Reports no changes in hearing [Reports changes in vision] : Reports no changes in vision [Reports changes in dental health] : Reports no changes in dental health [Guns at Home] : no guns at home [Safety elements used in home] : no safety elements used in home [Travel to Developing Areas] : does not  travel to developing areas [TB Exposure] : is not being exposed to tuberculosis [Caregiver Concerns] : does not have caregiver concerns [FreeTextEntry4] :  is healthcare proxy

## 2022-10-17 NOTE — HISTORY OF PRESENT ILLNESS
[FreeTextEntry1] : This is a 72-year-old female for annual health assessment\par Specifically we will address her history of elevated cholesterol asthma sleep apnea questionable reflux carotid artery stenosis [de-identified] : Overall patient is feeling well she did have recent lower GI bleed.  Colonoscopy just showed diverticuli\par \par She is complaining of insomnia.  She is complaining of a dry throat

## 2022-10-17 NOTE — ASSESSMENT
[FreeTextEntry1] : This is a 72-year-old female whose history has been reviewed above\par \par She has a history of sleep apnea in the past she did have surgical correction she never followed up she does not have symptomatology but is now complaining of the dry throat.  I told her I thought it prudent for her to receive an ENT and repeat sleep study\par \par She did have a history of Bell's palsy and had a complete recovery.  Apparently during that time she did see a neurologist out of the system who thought that she may have had a CVA placed her on aspirin.  This may be in part because of a reading of a questionable ulceration or mild ulceration in her carotids.  We repeated her MRA last year and this was completely normal except for apparent takeoff of the left carotid.  I do not have an MRI we will continue her on aspirin\par \par She carries a diagnosis of asthma but in point the fact on close questioning she currently just gets seasonal allergies no intervention\par \par She does have reflux she takes a PPI on a as needed basis\par \par She did have a colonoscopy for lower GI bleeding which showed diverticuli no intervention\par \par She did have frequent urinary tract infections and urinary frequency she did see  a cystoscopy was done and she was recommended to have surgery but this was COVID and she did not.  Unfortunately her urologist is no longer covered by her insurance but I told her to call her to give her the name of somebody who did take her insurance that could do the surgery\par \par Her bone density continues to show osteopenia.  She is up-to-date with mammography OB/GYN.  She is up-to-date with COVID vaccinations she will get a flu shot today she should get the shingles vaccinations\par \par She has hypertension today this has not been demonstrated in the past I will try to get her to take medication\par \par She did have borderline anemia in August serologic studies were negative I will repeat her CBC today her hematocrit was normal at that time\par \par I did repeat her blood pressure and it was somewhat lower at 144 systolic over 80 she is reluctant to take medications I told her to watch her weight salt exercise and we will repeat her blood pressure in 3 months.  We will also review her cholesterol and triglycerides unfortunately she was not fasting today I did tell her the next time she comes in either have a blood tests the day before or to come in fasting\par \par She does have hypertriglyceridemia and mildly elevated cholesterol this was repeated today recommendations will most likely be to start a statin particularly in view of her elevated blood pressure

## 2022-10-18 LAB
25(OH)D3 SERPL-MCNC: 47.4 NG/ML
ALBUMIN SERPL ELPH-MCNC: 4.3 G/DL
ALP BLD-CCNC: 99 U/L
ALT SERPL-CCNC: 12 U/L
ANION GAP SERPL CALC-SCNC: 8 MMOL/L
APPEARANCE: CLEAR
AST SERPL-CCNC: 21 U/L
BASOPHILS # BLD AUTO: 0.04 K/UL
BASOPHILS NFR BLD AUTO: 0.7 %
BILIRUB SERPL-MCNC: 0.2 MG/DL
BILIRUBIN URINE: NEGATIVE
BLOOD URINE: NEGATIVE
BUN SERPL-MCNC: 21 MG/DL
CALCIUM SERPL-MCNC: 10 MG/DL
CHLORIDE SERPL-SCNC: 105 MMOL/L
CHOLEST SERPL-MCNC: 209 MG/DL
CO2 SERPL-SCNC: 26 MMOL/L
COLOR: COLORLESS
COVID-19 NUCLEOCAPSID  GAM ANTIBODY INTERPRETATION: NEGATIVE
COVID-19 SPIKE DOMAIN ANTIBODY INTERPRETATION: POSITIVE
CREAT SERPL-MCNC: 0.84 MG/DL
EGFR: 73 ML/MIN/1.73M2
EOSINOPHIL # BLD AUTO: 0.18 K/UL
EOSINOPHIL NFR BLD AUTO: 3.2 %
ESTIMATED AVERAGE GLUCOSE: 117 MG/DL
GLUCOSE QUALITATIVE U: NEGATIVE
GLUCOSE SERPL-MCNC: 82 MG/DL
HBA1C MFR BLD HPLC: 5.7 %
HCT VFR BLD CALC: 35.7 %
HDLC SERPL-MCNC: 38 MG/DL
HGB BLD-MCNC: 11 G/DL
IMM GRANULOCYTES NFR BLD AUTO: 0.2 %
KETONES URINE: NEGATIVE
LDLC SERPL CALC-MCNC: 117 MG/DL
LEUKOCYTE ESTERASE URINE: NEGATIVE
LYMPHOCYTES # BLD AUTO: 1.82 K/UL
LYMPHOCYTES NFR BLD AUTO: 32.6 %
MAN DIFF?: NORMAL
MCHC RBC-ENTMCNC: 29.1 PG
MCHC RBC-ENTMCNC: 30.8 GM/DL
MCV RBC AUTO: 94.4 FL
MONOCYTES # BLD AUTO: 0.47 K/UL
MONOCYTES NFR BLD AUTO: 8.4 %
NEUTROPHILS # BLD AUTO: 3.06 K/UL
NEUTROPHILS NFR BLD AUTO: 54.9 %
NITRITE URINE: NEGATIVE
NONHDLC SERPL-MCNC: 171 MG/DL
PH URINE: 6
PLATELET # BLD AUTO: 203 K/UL
POTASSIUM SERPL-SCNC: 4.8 MMOL/L
PROT SERPL-MCNC: 6.8 G/DL
PROTEIN URINE: NEGATIVE
RBC # BLD: 3.78 M/UL
RBC # FLD: 14 %
SARS-COV-2 AB SERPL IA-ACNC: >250 U/ML
SARS-COV-2 AB SERPL QL IA: 0.08 INDEX
SODIUM SERPL-SCNC: 140 MMOL/L
SPECIFIC GRAVITY URINE: 1.01
T3RU NFR SERPL: 1 TBI
T4 SERPL-MCNC: 5.5 UG/DL
TRIGL SERPL-MCNC: 266 MG/DL
TSH SERPL-ACNC: 2.14 UIU/ML
URATE SERPL-MCNC: 3.6 MG/DL
UROBILINOGEN URINE: NORMAL
WBC # FLD AUTO: 5.58 K/UL

## 2023-01-31 ENCOUNTER — APPOINTMENT (OUTPATIENT)
Dept: INTERNAL MEDICINE | Facility: CLINIC | Age: 73
End: 2023-01-31
Payer: MEDICARE

## 2023-01-31 VITALS — WEIGHT: 123 LBS | HEIGHT: 57.09 IN | BODY MASS INDEX: 26.54 KG/M2

## 2023-01-31 DIAGNOSIS — R30.0 DYSURIA: ICD-10-CM

## 2023-01-31 PROCEDURE — 81003 URINALYSIS AUTO W/O SCOPE: CPT

## 2023-01-31 PROCEDURE — 36415 COLL VENOUS BLD VENIPUNCTURE: CPT

## 2023-01-31 PROCEDURE — 99214 OFFICE O/P EST MOD 30 MIN: CPT | Mod: 25

## 2023-01-31 NOTE — HISTORY OF PRESENT ILLNESS
[FreeTextEntry1] : This is a 72-year-old female for evaluation and treatment of her hypertension hypercholesterolemia anemia reflux questionable CVA [de-identified] : Patient has lost weight.  She wants to have her cholesterol repeated.  She is still suffering from reflux but takes her PPI on a as needed basis\par \par She did follow-up with hematology.  She is also concerned that she was told he has kyphoscoliosis\par \par She remains without cardiovascular complaints

## 2023-01-31 NOTE — ASSESSMENT
[FreeTextEntry1] : This is a 72-year-old female whose history has been reviewed above\par \par She has a history of hypertension her blood pressure today is slightly above goal we will increase her amlodipine to 5 mg\par \par She has a history of reflux she takes her PPI on a as needed basis I told her that she would have less breakthrough if she took her medications on a more sustained basis which I believe she will do\par \par She is concerned about kyphoscoliosis that was picked up on x-ray I reassured her that this was a process that occurred many years ago she is asymptomatic and she has nothing to worry about we will try to get a copy of the x-ray\par \par She does have hypercholesterolemia she has been dieting she has refrained from cholesterol-lowering medications at cholesterol profile will be obtained and recommendations pending results\par \par She does have a questionable history of a CVA I did emphasize that cholesterol-lowering medications would be very beneficial in preventing further episodes\par \par She did follow-up with hematology and was told that she had no pathologic processes.  I am awaiting the consultation.\par \par Is up-to-date with COVID vaccinations\par \par She is leaving she tells me that she is beginning to feel dysuria we will get a urine and urine culture I will treat her empirically pending results of culture

## 2023-01-31 NOTE — PHYSICAL EXAM
[No JVD] : no jugular venous distention [No Edema] : there was no peripheral edema [Normal] : soft, non-tender, non-distended, no masses palpated, no HSM and normal bowel sounds [No Focal Deficits] : no focal deficits [de-identified] : Kyphoscoliosis

## 2023-02-01 LAB
BILIRUB UR QL STRIP: NORMAL
CHOLEST SERPL-MCNC: 215 MG/DL
CLARITY UR: CLEAR
COLLECTION METHOD: NORMAL
GLUCOSE UR-MCNC: NORMAL
HCG UR QL: 0.2 EU/DL
HDLC SERPL-MCNC: 42 MG/DL
HGB UR QL STRIP.AUTO: NORMAL
KETONES UR-MCNC: NORMAL
LDLC SERPL CALC-MCNC: 139 MG/DL
LEUKOCYTE ESTERASE UR QL STRIP: NORMAL
NITRITE UR QL STRIP: NORMAL
NONHDLC SERPL-MCNC: 172 MG/DL
PH UR STRIP: 6.5
PROT UR STRIP-MCNC: NORMAL
SP GR UR STRIP: 1.02
TRIGL SERPL-MCNC: 169 MG/DL

## 2023-02-02 LAB — BACTERIA UR CULT: NORMAL

## 2023-02-24 ENCOUNTER — TRANSCRIPTION ENCOUNTER (OUTPATIENT)
Age: 73
End: 2023-02-24

## 2023-02-27 LAB
ALBUMIN SERPL ELPH-MCNC: 4.4 G/DL
ALP BLD-CCNC: 97 U/L
ALT SERPL-CCNC: 17 U/L
AST SERPL-CCNC: 24 U/L
BILIRUB DIRECT SERPL-MCNC: 0.1 MG/DL
BILIRUB INDIRECT SERPL-MCNC: 0.4 MG/DL
BILIRUB SERPL-MCNC: 0.5 MG/DL
CHOLEST SERPL-MCNC: 128 MG/DL
HDLC SERPL-MCNC: 42 MG/DL
LDLC SERPL CALC-MCNC: 68 MG/DL
NONHDLC SERPL-MCNC: 86 MG/DL
PROT SERPL-MCNC: 6.7 G/DL
TRIGL SERPL-MCNC: 87 MG/DL

## 2023-05-03 ENCOUNTER — RX RENEWAL (OUTPATIENT)
Age: 73
End: 2023-05-03

## 2023-05-03 ENCOUNTER — LABORATORY RESULT (OUTPATIENT)
Age: 73
End: 2023-05-03

## 2023-05-03 ENCOUNTER — APPOINTMENT (OUTPATIENT)
Dept: INTERNAL MEDICINE | Facility: CLINIC | Age: 73
End: 2023-05-03
Payer: MEDICARE

## 2023-05-03 VITALS
BODY MASS INDEX: 26.1 KG/M2 | WEIGHT: 121 LBS | DIASTOLIC BLOOD PRESSURE: 82 MMHG | HEIGHT: 57.09 IN | SYSTOLIC BLOOD PRESSURE: 142 MMHG

## 2023-05-03 PROCEDURE — 36415 COLL VENOUS BLD VENIPUNCTURE: CPT

## 2023-05-03 PROCEDURE — 99214 OFFICE O/P EST MOD 30 MIN: CPT | Mod: 25

## 2023-05-03 RX ORDER — MONTELUKAST 10 MG/1
10 TABLET, FILM COATED ORAL
Qty: 90 | Refills: 3 | Status: ACTIVE | COMMUNITY
Start: 2017-02-13 | End: 1900-01-01

## 2023-05-03 NOTE — ASSESSMENT
[FreeTextEntry1] : This is a 72-year-old female whose history has been reviewed above\par \par She has a history of hypertension her blood pressure is at goal she has no orthostasis no medication changes.  But would encourage weight loss and salt restriction\par \par She has a history of hypercholesterolemia and a questionable history of a TIA she remains on atorvastatin a cholesterol profile has been obtained medication changes predicated on the results.  In terms of her muscle cramps I told her to try co-Q10 vitamin D and or quinine water\par \par She has a history of reflux she remains on a PPI\par \par In terms of her TIA we we will maintain her on aspirin\par \par She does have allergies I will start her on Flonase and montelukast\par \par I did ask her to get the new COVID booster

## 2023-05-03 NOTE — HISTORY OF PRESENT ILLNESS
[FreeTextEntry1] : This is a 72-year-old female for evaluation and treatment of her hypertension hypercholesterolemia reflux very questionable history of a CVA [de-identified] : Patient is complaining of allergies and nasal conjunctival..  She is also complaining of muscle cramps\par \par She has no cardiovascular complaints.\par \par And she is taking her medications

## 2023-05-04 LAB
ALBUMIN SERPL ELPH-MCNC: 4.5 G/DL
ALP BLD-CCNC: 103 U/L
ALT SERPL-CCNC: 20 U/L
ANION GAP SERPL CALC-SCNC: 12 MMOL/L
AST SERPL-CCNC: 24 U/L
BASOPHILS # BLD AUTO: 0.04 K/UL
BASOPHILS NFR BLD AUTO: 0.8 %
BILIRUB SERPL-MCNC: 0.4 MG/DL
BUN SERPL-MCNC: 19 MG/DL
CALCIUM SERPL-MCNC: 9.9 MG/DL
CHLORIDE SERPL-SCNC: 107 MMOL/L
CHOLEST SERPL-MCNC: 131 MG/DL
CO2 SERPL-SCNC: 27 MMOL/L
CREAT SERPL-MCNC: 0.99 MG/DL
EGFR: 60 ML/MIN/1.73M2
EOSINOPHIL # BLD AUTO: 0.43 K/UL
EOSINOPHIL NFR BLD AUTO: 8.7 %
ESTIMATED AVERAGE GLUCOSE: 114 MG/DL
GLUCOSE SERPL-MCNC: 74 MG/DL
HBA1C MFR BLD HPLC: 5.6 %
HCT VFR BLD CALC: 36.6 %
HDLC SERPL-MCNC: 45 MG/DL
HGB BLD-MCNC: 11.5 G/DL
IMM GRANULOCYTES NFR BLD AUTO: 0.2 %
LDLC SERPL CALC-MCNC: 66 MG/DL
LYMPHOCYTES # BLD AUTO: 1.33 K/UL
LYMPHOCYTES NFR BLD AUTO: 26.8 %
MAN DIFF?: NORMAL
MCHC RBC-ENTMCNC: 29.4 PG
MCHC RBC-ENTMCNC: 31.4 GM/DL
MCV RBC AUTO: 93.6 FL
MONOCYTES # BLD AUTO: 0.41 K/UL
MONOCYTES NFR BLD AUTO: 8.2 %
NEUTROPHILS # BLD AUTO: 2.75 K/UL
NEUTROPHILS NFR BLD AUTO: 55.3 %
NONHDLC SERPL-MCNC: 86 MG/DL
PLATELET # BLD AUTO: 186 K/UL
POTASSIUM SERPL-SCNC: 4.4 MMOL/L
PROT SERPL-MCNC: 7.1 G/DL
RBC # BLD: 3.91 M/UL
RBC # FLD: 14.1 %
SODIUM SERPL-SCNC: 146 MMOL/L
T3RU NFR SERPL: 1 TBI
T4 SERPL-MCNC: 5.5 UG/DL
TRIGL SERPL-MCNC: 99 MG/DL
TSH SERPL-ACNC: 2.4 UIU/ML
URATE SERPL-MCNC: 4.2 MG/DL
WBC # FLD AUTO: 4.97 K/UL

## 2023-05-11 LAB
ANION GAP SERPL CALC-SCNC: 10 MMOL/L
BUN SERPL-MCNC: 21 MG/DL
CALCIUM SERPL-MCNC: 9.7 MG/DL
CHLORIDE SERPL-SCNC: 104 MMOL/L
CO2 SERPL-SCNC: 28 MMOL/L
CREAT SERPL-MCNC: 0.9 MG/DL
EGFR: 68 ML/MIN/1.73M2
GLUCOSE SERPL-MCNC: 99 MG/DL
POTASSIUM SERPL-SCNC: 4.3 MMOL/L
SODIUM SERPL-SCNC: 141 MMOL/L

## 2023-08-02 ENCOUNTER — RX RENEWAL (OUTPATIENT)
Age: 73
End: 2023-08-02

## 2023-08-27 NOTE — ED PROVIDER NOTE - ATTENDING CONTRIBUTION TO CARE
negative...
I performed a history and physical exam of the patient and discussed their management with the advanced care provider. I reviewed the advanced care provider's note and agree with the documented findings and plan of care. My medical decision making and objective findings are found above.

## 2023-08-30 ENCOUNTER — APPOINTMENT (OUTPATIENT)
Dept: INTERNAL MEDICINE | Facility: CLINIC | Age: 73
End: 2023-08-30
Payer: MEDICARE

## 2023-08-30 VITALS
DIASTOLIC BLOOD PRESSURE: 70 MMHG | HEIGHT: 57.09 IN | SYSTOLIC BLOOD PRESSURE: 110 MMHG | BODY MASS INDEX: 27.18 KG/M2 | WEIGHT: 126 LBS

## 2023-08-30 PROCEDURE — 99212 OFFICE O/P EST SF 10 MIN: CPT

## 2023-08-30 RX ORDER — NITROFURANTOIN (MONOHYDRATE/MACROCRYSTALS) 25; 75 MG/1; MG/1
100 CAPSULE ORAL TWICE DAILY
Qty: 14 | Refills: 0 | Status: DISCONTINUED | COMMUNITY
Start: 2020-03-13 | End: 2023-08-30

## 2023-08-30 RX ORDER — MONTELUKAST 10 MG/1
10 TABLET, FILM COATED ORAL
Qty: 14 | Refills: 1 | Status: DISCONTINUED | COMMUNITY
Start: 2023-05-03 | End: 2023-08-30

## 2023-08-30 NOTE — HISTORY OF PRESENT ILLNESS
[FreeTextEntry1] : This is a 72-year-old female for evaluation and treatment of her hypertension and hypercholesterolemia [de-identified] : Patient is complaining of severe muscle cramps and chronic leg edema which she states is made worse by her amlodipine

## 2023-08-30 NOTE — ASSESSMENT
[FreeTextEntry1] : This is a pleasant 72-year-old female whose history has been reviewed above  Her blood pressure today is normal.  And her edema is really minimal.  However her complaints are compelling I will discontinue both medications.  We will redraw bloods in about 10 days off medication and assay her blood pressure.  We will most likely start her on hydrochlorothiazide and Nexletol.  We will also give her a flu shot at that time and suggest RSV she is up-to-date with pneumonia

## 2023-08-30 NOTE — PHYSICAL EXAM
[No JVD] : no jugular venous distention [Normal] : affect was normal and insight and judgment were intact [de-identified] : Minimal edema

## 2023-08-30 NOTE — PHYSICAL EXAM
[No JVD] : no jugular venous distention [Normal] : affect was normal and insight and judgment were intact [de-identified] : Minimal edema

## 2023-08-30 NOTE — HISTORY OF PRESENT ILLNESS
[FreeTextEntry1] : This is a 72-year-old female for evaluation and treatment of her hypertension and hypercholesterolemia [de-identified] : Patient is complaining of severe muscle cramps and chronic leg edema which she states is made worse by her amlodipine

## 2023-09-12 ENCOUNTER — MED ADMIN CHARGE (OUTPATIENT)
Age: 73
End: 2023-09-12

## 2023-09-12 ENCOUNTER — APPOINTMENT (OUTPATIENT)
Dept: INTERNAL MEDICINE | Facility: CLINIC | Age: 73
End: 2023-09-12
Payer: MEDICARE

## 2023-09-12 VITALS
DIASTOLIC BLOOD PRESSURE: 80 MMHG | SYSTOLIC BLOOD PRESSURE: 160 MMHG | BODY MASS INDEX: 26.75 KG/M2 | WEIGHT: 124 LBS | HEIGHT: 57.09 IN

## 2023-09-12 PROCEDURE — 90662 IIV NO PRSV INCREASED AG IM: CPT

## 2023-09-12 PROCEDURE — 99214 OFFICE O/P EST MOD 30 MIN: CPT

## 2023-09-12 PROCEDURE — G0008: CPT

## 2023-09-12 RX ORDER — MULTIVIT,IRON,MINERALS/LUTEIN
TABLET ORAL DAILY
Refills: 0 | Status: ACTIVE | COMMUNITY

## 2023-09-12 RX ORDER — WHEAT DEXTRIN 3 G/4 G
POWDER (GRAM) ORAL
Refills: 0 | Status: ACTIVE | COMMUNITY

## 2023-09-12 RX ORDER — AMLODIPINE BESYLATE 2.5 MG/1
2.5 TABLET ORAL
Qty: 1 | Refills: 3 | Status: DISCONTINUED | COMMUNITY
Start: 2022-10-21 | End: 2023-09-12

## 2023-09-12 RX ORDER — MULTIVIT-MIN/FA/LYCOPEN/LUTEIN .4-300-25
TABLET ORAL DAILY
Refills: 0 | Status: ACTIVE | COMMUNITY

## 2023-09-12 RX ORDER — ATORVASTATIN CALCIUM 10 MG/1
10 TABLET, FILM COATED ORAL
Qty: 90 | Refills: 0 | Status: DISCONTINUED | COMMUNITY
Start: 2023-02-13 | End: 2023-09-12

## 2023-10-04 NOTE — ASU PREOP CHECKLIST - NS PREOP CHK HIBICLENS NA
Hui Martínez is a 60year old female. Patient presents with:  Post-Op: Patient is here for post-op for septoplasty. ASSESSMENT AND PLAN:   1. Nasal septal deviation  60year-old status post septoplasty for deviation and epistaxis. Doing well. Using the saline spray and gel. Septum straight and healing well. Mucus and crusting debrided. Should continue the saline spray and gel. Will take another week to 2 weeks to have the congestion decreased even further. Can return if there are any issues or bleeding. 2. Epistaxis        The patient indicates understanding of these issues and agrees to the plan. EXAM:   There were no vitals taken for this visit. Pertinent exam findings may also be noted above in assessment and plan     System Details   Skin Inspection - Normal.   Constitutional Overall appearance - Normal.   Head/Face Symmetric, TMJ tenderness not present    Eyes EOMI, PERRL   Right ear:  Canal clear, TM intact, no NATASHA   Left ear:  Canal clear, TM intact, no NATASHA   Nose: Septum midline, inferior turbinates not enlarged, nasal valves without collapse    Oral cavity/Oropharynx: No lesions or masses on inspection or palpation, tonsils symmetric    Neck: Soft without LAD, thyroid not enlarged  Voice clear/ no stridor   Other:      Scopes and Procedures:             Current Outpatient Medications   Medication Sig Dispense Refill    escitalopram 20 MG Oral Tab Take 1 tablet (20 mg total) by mouth daily. 90 tablet 3    sodium chloride 0.65 % Nasal Solution 2 sprays by Nasal route in the morning, at noon, and at bedtime. 1 each 0    ibuprofen 600 MG Oral Tab Take 1 tablet (600 mg total) by mouth every 6 (six) hours as needed for Pain. (Patient not taking: Reported on 9/27/2023)      hydrOXYzine 25 MG Oral Tab Taking 1 at bedtime as needed.  (Patient not taking: Reported on 9/27/2023) 30 tablet 0      Past Medical History:   Diagnosis Date    Anxiety       Social History:  Social History Socioeconomic History    Marital status:    Tobacco Use    Smoking status: Never    Smokeless tobacco: Never   Vaping Use    Vaping Use: Never used   Substance and Sexual Activity    Alcohol use: Yes     Comment: Social    Drug use: Never   Other Topics Concern    Caffeine Concern No    Stress Concern No    Weight Concern Yes    Special Diet No    Exercise No    Seat Belt Yes          Henrry Johansen MD  10/4/2023  11:13 AM N/A

## 2023-10-27 ENCOUNTER — LABORATORY RESULT (OUTPATIENT)
Age: 73
End: 2023-10-27

## 2023-10-27 ENCOUNTER — APPOINTMENT (OUTPATIENT)
Dept: INTERNAL MEDICINE | Facility: CLINIC | Age: 73
End: 2023-10-27
Payer: MEDICARE

## 2023-10-27 VITALS
DIASTOLIC BLOOD PRESSURE: 70 MMHG | WEIGHT: 124 LBS | HEIGHT: 57 IN | SYSTOLIC BLOOD PRESSURE: 120 MMHG | BODY MASS INDEX: 26.75 KG/M2

## 2023-10-27 VITALS — DIASTOLIC BLOOD PRESSURE: 70 MMHG | SYSTOLIC BLOOD PRESSURE: 118 MMHG

## 2023-10-27 DIAGNOSIS — R21 RASH AND OTHER NONSPECIFIC SKIN ERUPTION: ICD-10-CM

## 2023-10-27 DIAGNOSIS — R05.3 CHRONIC COUGH: ICD-10-CM

## 2023-10-27 DIAGNOSIS — E78.5 HYPERLIPIDEMIA, UNSPECIFIED: ICD-10-CM

## 2023-10-27 DIAGNOSIS — R22.1 LOCALIZED SWELLING, MASS AND LUMP, NECK: ICD-10-CM

## 2023-10-27 PROCEDURE — 99214 OFFICE O/P EST MOD 30 MIN: CPT | Mod: 25

## 2023-10-27 PROCEDURE — 36415 COLL VENOUS BLD VENIPUNCTURE: CPT

## 2023-10-27 RX ORDER — CLOTRIMAZOLE AND BETAMETHASONE DIPROPIONATE 10; .5 MG/G; MG/G
1-0.05 CREAM TOPICAL
Qty: 1 | Refills: 0 | Status: ACTIVE | COMMUNITY
Start: 2023-10-27 | End: 1900-01-01

## 2023-10-31 LAB
ALBUMIN SERPL ELPH-MCNC: 4.1 G/DL
ALP BLD-CCNC: 89 U/L
ALT SERPL-CCNC: 20 U/L
ANION GAP SERPL CALC-SCNC: 10 MMOL/L
AST SERPL-CCNC: 26 U/L
BASOPHILS # BLD AUTO: 0.05 K/UL
BASOPHILS NFR BLD AUTO: 0.8 %
BILIRUB SERPL-MCNC: 0.4 MG/DL
BUN SERPL-MCNC: 21 MG/DL
CALCIUM SERPL-MCNC: 9.3 MG/DL
CHLORIDE SERPL-SCNC: 105 MMOL/L
CHOLEST SERPL-MCNC: 131 MG/DL
CO2 SERPL-SCNC: 25 MMOL/L
CREAT SERPL-MCNC: 0.96 MG/DL
EGFR: 62 ML/MIN/1.73M2
EOSINOPHIL # BLD AUTO: 0.31 K/UL
EOSINOPHIL NFR BLD AUTO: 4.9 %
ESTIMATED AVERAGE GLUCOSE: 111 MG/DL
GLUCOSE SERPL-MCNC: 92 MG/DL
HBA1C MFR BLD HPLC: 5.5 %
HCT VFR BLD CALC: 34.1 %
HDLC SERPL-MCNC: 36 MG/DL
HGB BLD-MCNC: 10.7 G/DL
IMM GRANULOCYTES NFR BLD AUTO: 0.3 %
LDLC SERPL CALC-MCNC: 60 MG/DL
LYMPHOCYTES # BLD AUTO: 1.78 K/UL
LYMPHOCYTES NFR BLD AUTO: 27.9 %
MAN DIFF?: NORMAL
MCHC RBC-ENTMCNC: 29.8 PG
MCHC RBC-ENTMCNC: 31.4 GM/DL
MCV RBC AUTO: 95 FL
MONOCYTES # BLD AUTO: 0.65 K/UL
MONOCYTES NFR BLD AUTO: 10.2 %
NEUTROPHILS # BLD AUTO: 3.58 K/UL
NEUTROPHILS NFR BLD AUTO: 55.9 %
NONHDLC SERPL-MCNC: 96 MG/DL
PLATELET # BLD AUTO: 185 K/UL
POTASSIUM SERPL-SCNC: 4.8 MMOL/L
PROT SERPL-MCNC: 6.9 G/DL
RBC # BLD: 3.59 M/UL
RBC # FLD: 14 %
SODIUM SERPL-SCNC: 140 MMOL/L
T3RU NFR SERPL: 1 TBI
T4 SERPL-MCNC: 5.6 UG/DL
TRIGL SERPL-MCNC: 221 MG/DL
TSH SERPL-ACNC: 1.95 UIU/ML
URATE SERPL-MCNC: 3.7 MG/DL
WBC # FLD AUTO: 6.39 K/UL

## 2024-01-11 ENCOUNTER — APPOINTMENT (OUTPATIENT)
Dept: INTERNAL MEDICINE | Facility: CLINIC | Age: 74
End: 2024-01-11

## 2024-01-11 ENCOUNTER — APPOINTMENT (OUTPATIENT)
Dept: INTERNAL MEDICINE | Facility: CLINIC | Age: 74
End: 2024-01-11
Payer: MEDICARE

## 2024-01-11 DIAGNOSIS — U07.1 COVID-19: ICD-10-CM

## 2024-01-11 LAB
RAPID RVP RESULT: DETECTED
SARS-COV-2 RNA PNL RESP NAA+PROBE: DETECTED

## 2024-01-11 PROCEDURE — 99214 OFFICE O/P EST MOD 30 MIN: CPT | Mod: 95

## 2024-01-11 NOTE — HISTORY OF PRESENT ILLNESS
[Home] : at home, [unfilled] , at the time of the visit. [Medical Office: ([facility])___] : at the medical office located in  [Verbal consent obtained from patient] : the patient, [unfilled] [FreeTextEntry8] : 73 year F With a history of hld, anemia now with COVID   Vaccinated: yes Symptom Start: tues Positive test: today Symptoms: cough, congestion, body aches Fever: 101 at one point SOB: none

## 2024-01-11 NOTE — ASSESSMENT
[FreeTextEntry1] : Patient with COVID 19, told to continue quarantine. Stay hydrated. Continue OTC meds.  Wash hands frequently.  Monitor pulse ox , if lower than 92 and maintaining this number to call office.  If SOB or CP to also call office or go to the ED.   would recommend paxlovid will order Tessalon perles also   stop simvastatin for 3 days post end of the paxlovid

## 2024-01-11 NOTE — REVIEW OF SYSTEMS
[Fever] : fever [Fatigue] : fatigue [Nasal Discharge] : nasal discharge [Sore Throat] : sore throat [Cough] : no cough

## 2024-01-31 ENCOUNTER — APPOINTMENT (OUTPATIENT)
Dept: INTERNAL MEDICINE | Facility: CLINIC | Age: 74
End: 2024-01-31

## 2024-02-06 ENCOUNTER — RESULT CHARGE (OUTPATIENT)
Age: 74
End: 2024-02-06

## 2024-02-07 ENCOUNTER — APPOINTMENT (OUTPATIENT)
Dept: INTERNAL MEDICINE | Facility: CLINIC | Age: 74
End: 2024-02-07
Payer: MEDICARE

## 2024-02-07 ENCOUNTER — NON-APPOINTMENT (OUTPATIENT)
Age: 74
End: 2024-02-07

## 2024-02-07 ENCOUNTER — MED ADMIN CHARGE (OUTPATIENT)
Age: 74
End: 2024-02-07

## 2024-02-07 ENCOUNTER — LABORATORY RESULT (OUTPATIENT)
Age: 74
End: 2024-02-07

## 2024-02-07 VITALS
SYSTOLIC BLOOD PRESSURE: 130 MMHG | HEIGHT: 57.28 IN | BODY MASS INDEX: 26.32 KG/M2 | WEIGHT: 122 LBS | DIASTOLIC BLOOD PRESSURE: 80 MMHG

## 2024-02-07 DIAGNOSIS — H04.123 DRY EYE SYNDROME OF BILATERAL LACRIMAL GLANDS: ICD-10-CM

## 2024-02-07 DIAGNOSIS — M85.851 OTHER SPECIFIED DISORDERS OF BONE DENSITY AND STRUCTURE, RIGHT THIGH: ICD-10-CM

## 2024-02-07 DIAGNOSIS — Z87.898 PERSONAL HISTORY OF OTHER SPECIFIED CONDITIONS: ICD-10-CM

## 2024-02-07 DIAGNOSIS — M85.852 OTHER SPECIFIED DISORDERS OF BONE DENSITY AND STRUCTURE, RIGHT THIGH: ICD-10-CM

## 2024-02-07 PROCEDURE — 93000 ELECTROCARDIOGRAM COMPLETE: CPT

## 2024-02-07 PROCEDURE — G0009: CPT

## 2024-02-07 PROCEDURE — 99213 OFFICE O/P EST LOW 20 MIN: CPT | Mod: 25

## 2024-02-07 PROCEDURE — 90677 PCV20 VACCINE IM: CPT

## 2024-02-07 PROCEDURE — G0439: CPT

## 2024-02-07 PROCEDURE — 36415 COLL VENOUS BLD VENIPUNCTURE: CPT

## 2024-02-07 RX ORDER — SIMVASTATIN 20 MG/1
20 TABLET, FILM COATED ORAL DAILY
Qty: 90 | Refills: 3 | Status: DISCONTINUED | COMMUNITY
Start: 2023-09-12 | End: 2024-02-07

## 2024-02-07 RX ORDER — NIRMATRELVIR AND RITONAVIR 300-100 MG
20 X 150 MG & KIT ORAL
Qty: 30 | Refills: 0 | Status: DISCONTINUED | COMMUNITY
Start: 2024-01-11 | End: 2024-02-07

## 2024-02-08 ENCOUNTER — TRANSCRIPTION ENCOUNTER (OUTPATIENT)
Age: 74
End: 2024-02-08

## 2024-02-08 LAB
25(OH)D3 SERPL-MCNC: 47.5 NG/ML
ALBUMIN SERPL ELPH-MCNC: 4.3 G/DL
ALP BLD-CCNC: 93 U/L
ALT SERPL-CCNC: 14 U/L
ANION GAP SERPL CALC-SCNC: 10 MMOL/L
APPEARANCE: CLEAR
AST SERPL-CCNC: 25 U/L
BASOPHILS # BLD AUTO: 0.03 K/UL
BASOPHILS NFR BLD AUTO: 0.6 %
BILIRUB SERPL-MCNC: 0.4 MG/DL
BILIRUBIN URINE: NEGATIVE
BLOOD URINE: NEGATIVE
BUN SERPL-MCNC: 23 MG/DL
CALCIUM SERPL-MCNC: 9.5 MG/DL
CHLORIDE SERPL-SCNC: 104 MMOL/L
CHOLEST SERPL-MCNC: 215 MG/DL
CO2 SERPL-SCNC: 26 MMOL/L
COLOR: YELLOW
CREAT SERPL-MCNC: 0.93 MG/DL
EGFR: 64 ML/MIN/1.73M2
EOSINOPHIL # BLD AUTO: 0.27 K/UL
EOSINOPHIL NFR BLD AUTO: 5.3 %
ESTIMATED AVERAGE GLUCOSE: 114 MG/DL
GLUCOSE QUALITATIVE U: NEGATIVE MG/DL
GLUCOSE SERPL-MCNC: 76 MG/DL
HBA1C MFR BLD HPLC: 5.6 %
HCT VFR BLD CALC: 35.1 %
HDLC SERPL-MCNC: 42 MG/DL
HGB BLD-MCNC: 11.2 G/DL
IMM GRANULOCYTES NFR BLD AUTO: 0.2 %
KETONES URINE: NEGATIVE MG/DL
LDLC SERPL CALC-MCNC: 143 MG/DL
LEUKOCYTE ESTERASE URINE: ABNORMAL
LYMPHOCYTES # BLD AUTO: 1.63 K/UL
LYMPHOCYTES NFR BLD AUTO: 32.1 %
MAGNESIUM SERPL-MCNC: 2.1 MG/DL
MAN DIFF?: NORMAL
MCHC RBC-ENTMCNC: 29.9 PG
MCHC RBC-ENTMCNC: 31.9 GM/DL
MCV RBC AUTO: 93.6 FL
MONOCYTES # BLD AUTO: 0.51 K/UL
MONOCYTES NFR BLD AUTO: 10.1 %
NEUTROPHILS # BLD AUTO: 2.62 K/UL
NEUTROPHILS NFR BLD AUTO: 51.7 %
NITRITE URINE: NEGATIVE
NONHDLC SERPL-MCNC: 173 MG/DL
PH URINE: 6
PLATELET # BLD AUTO: 191 K/UL
POTASSIUM SERPL-SCNC: 4.3 MMOL/L
PROT SERPL-MCNC: 7.1 G/DL
PROTEIN URINE: NEGATIVE MG/DL
RBC # BLD: 3.75 M/UL
RBC # FLD: 15.2 %
SODIUM SERPL-SCNC: 140 MMOL/L
SPECIFIC GRAVITY URINE: 1.01
T3RU NFR SERPL: 1 TBI
T4 SERPL-MCNC: 5.5 UG/DL
TRIGL SERPL-MCNC: 164 MG/DL
TSH SERPL-ACNC: 2.88 UIU/ML
URATE SERPL-MCNC: 3.8 MG/DL
UROBILINOGEN URINE: 0.2 MG/DL
WBC # FLD AUTO: 5.07 K/UL

## 2024-02-08 NOTE — HEALTH RISK ASSESSMENT
[Good] : ~his/her~  mood as  good [Yes] : Yes [None] : None [With Significant Other] : lives with significant other [Retired] : retired [] :  [Feels Safe at Home] : Feels safe at home [Neglect Or Abandonment] : neglect or abandonment [Fully functional (bathing, dressing, toileting, transferring, walking, feeding)] : Fully functional (bathing, dressing, toileting, transferring, walking, feeding) [Fully functional (using the telephone, shopping, preparing meals, housekeeping, doing laundry, using] : Fully functional and needs no help or supervision to perform IADLs (using the telephone, shopping, preparing meals, housekeeping, doing laundry, using transportation, managing medications and managing finances) [Smoke Detector] : smoke detector [Carbon Monoxide Detector] : carbon monoxide detector [Sunscreen] : uses sunscreen [I will adhere to the patient's wishes.] : I will adhere to the patient's wishes. [Never] : Never [Change in mental status noted] : No change in mental status noted [Reports changes in hearing] : Reports no changes in hearing [Reports changes in vision] : Reports no changes in vision [Reports changes in dental health] : Reports no changes in dental health [Safety elements used in home] : no safety elements used in home [TB Exposure] : is not being exposed to tuberculosis

## 2024-02-08 NOTE — HISTORY OF PRESENT ILLNESS
[FreeTextEntry1] : This is a 73-year-old female for annual health assessment Specifically we will address her history of hypercholesterolemia "asthma" sleep apnea reflux carotid artery stenosis.  She also had a lower GI bleed [de-identified] :  patient continues to have multiple and somewhat varied complaints.  She feels fatigued and has headaches which she attributes to her recent bout of COVID.  She states that she stopped her statins during COVID and her muscle pain disappeared in addition she is complaining of dry mouth and dry eyes.  And is concerned about a  bladder prolapse although she seems to be asymptomatic

## 2024-02-08 NOTE — ASSESSMENT
[FreeTextEntry1] :  this is a 73-year-old female for annual health assessment  She has a history of sleep apnea in the past she did have surgical correction she does not follow-up she is complaining of a dry throat I have sent her to ENT I do think she would benefit from a repeat sleep study  She has a history of Bell's palsy and recovered completely.  She was seen by a neurologist who felt that she may have had a CVA placed her on aspirin.  She has had MRAs the last 1 reported is normal however we will continue her on aspirin  She no longer carries a diagnosis of asthma  She does have reflux she remains on an parasol a PPI.  A magnesium level was obtained  She did have COVID she is complaining of headaches and fatigue we will do routine bloods including a thyroid profile however I agree that this is probably postviral and she should get better  She has a history of hypertension she remains on losartan her blood pressure is normal she has no orthostasis no medication changes  She does have hypercholesterolemia she was on simvastatin apparently during her.  Of COVID she stopped the simvastatin appropriately and muscle pains disappeared I will assume that she is statin intolerant and will start her on Zetia  In addition she is complaining of both dry eyes and dry mouth her eyes are controlled with drops I did ask her to follow-up with ENT and to just use lozenges in the interim however I will draw Sjogren's antibodies  All in all her new issues are statin intolerance.  Dry eyes as well as mouth.  Post COVID syndrome.  Additionally she is complaining of a chronic bladder prolapse and I will send her to urogynecology  We will give her a prescription for mammography bone density

## 2024-02-09 LAB
ENA SS-A AB SER IA-ACNC: 1.9 AL
ENA SS-B AB SER IA-ACNC: <0.2 AL

## 2024-03-27 ENCOUNTER — NON-APPOINTMENT (OUTPATIENT)
Age: 74
End: 2024-03-27

## 2024-04-05 ENCOUNTER — APPOINTMENT (OUTPATIENT)
Dept: INTERNAL MEDICINE | Facility: CLINIC | Age: 74
End: 2024-04-05
Payer: MEDICARE

## 2024-04-05 VITALS
WEIGHT: 123 LBS | TEMPERATURE: 99.2 F | HEART RATE: 78 BPM | HEIGHT: 57.28 IN | BODY MASS INDEX: 26.54 KG/M2 | OXYGEN SATURATION: 97 % | DIASTOLIC BLOOD PRESSURE: 75 MMHG | SYSTOLIC BLOOD PRESSURE: 122 MMHG

## 2024-04-05 PROCEDURE — 99214 OFFICE O/P EST MOD 30 MIN: CPT

## 2024-04-05 RX ORDER — BENZONATATE 100 MG/1
100 CAPSULE ORAL EVERY 8 HOURS
Qty: 30 | Refills: 0 | Status: DISCONTINUED | COMMUNITY
Start: 2024-01-11 | End: 2024-04-05

## 2024-04-05 RX ORDER — PROMETHAZINE HYDROCHLORIDE AND DEXTROMETHORPHAN HYDROBROMIDE ORAL SOLUTION 15; 6.25 MG/5ML; MG/5ML
6.25-15 SOLUTION ORAL
Qty: 1 | Refills: 0 | Status: ACTIVE | COMMUNITY
Start: 2024-04-05 | End: 1900-01-01

## 2024-04-05 RX ORDER — FLUTICASONE PROPIONATE 50 UG/1
50 SPRAY, METERED NASAL
Qty: 48 | Refills: 0 | Status: ACTIVE | COMMUNITY
Start: 2023-05-03 | End: 1900-01-01

## 2024-04-07 NOTE — HISTORY OF PRESENT ILLNESS
[FreeTextEntry8] : 73 year old female with c/o being sick since last thursday.  She is coughing up a lot of phlegm at night.  She feels it is in her head, very full.  No more fevers.  Went to , treated with otc meds, nasal  spray

## 2024-04-07 NOTE — ASSESSMENT
[FreeTextEntry1] : Recommend antibiotics, nasal saline, and guaifenesin. Side effect of treatment includes but not limited to diarrhea. Return if symptoms worsen or persist.  fu if worsening

## 2024-05-22 ENCOUNTER — LABORATORY RESULT (OUTPATIENT)
Age: 74
End: 2024-05-22

## 2024-05-22 ENCOUNTER — APPOINTMENT (OUTPATIENT)
Dept: INTERNAL MEDICINE | Facility: CLINIC | Age: 74
End: 2024-05-22
Payer: MEDICARE

## 2024-05-22 VITALS — SYSTOLIC BLOOD PRESSURE: 120 MMHG | DIASTOLIC BLOOD PRESSURE: 70 MMHG

## 2024-05-22 VITALS — SYSTOLIC BLOOD PRESSURE: 118 MMHG | DIASTOLIC BLOOD PRESSURE: 70 MMHG

## 2024-05-22 VITALS — BODY MASS INDEX: 26.35 KG/M2 | WEIGHT: 122.13 LBS | HEIGHT: 57.28 IN

## 2024-05-22 DIAGNOSIS — M35.00 SICCA SYNDROME, UNSPECIFIED: ICD-10-CM

## 2024-05-22 DIAGNOSIS — D64.9 ANEMIA, UNSPECIFIED: ICD-10-CM

## 2024-05-22 DIAGNOSIS — E78.00 PURE HYPERCHOLESTEROLEMIA, UNSPECIFIED: ICD-10-CM

## 2024-05-22 DIAGNOSIS — K21.9 GASTRO-ESOPHAGEAL REFLUX DISEASE W/OUT ESOPHAGITIS: ICD-10-CM

## 2024-05-22 DIAGNOSIS — I10 ESSENTIAL (PRIMARY) HYPERTENSION: ICD-10-CM

## 2024-05-22 PROCEDURE — 99214 OFFICE O/P EST MOD 30 MIN: CPT

## 2024-05-22 PROCEDURE — 36415 COLL VENOUS BLD VENIPUNCTURE: CPT

## 2024-05-22 PROCEDURE — G2211 COMPLEX E/M VISIT ADD ON: CPT

## 2024-05-22 RX ORDER — ASPIRIN 81 MG
81 TABLET, DELAYED RELEASE (ENTERIC COATED) ORAL
Refills: 0 | Status: DISCONTINUED | COMMUNITY
End: 2024-05-22

## 2024-05-22 RX ORDER — AMOXICILLIN AND CLAVULANATE POTASSIUM 875; 125 MG/1; MG/1
875-125 TABLET, COATED ORAL
Qty: 10 | Refills: 0 | Status: DISCONTINUED | COMMUNITY
Start: 2024-04-05 | End: 2024-05-22

## 2024-05-22 NOTE — HISTORY OF PRESENT ILLNESS
[FreeTextEntry1] : This is a 73-year-old female For evaluation and treatment of her hypertension hypercholesterolemia reflux probable Sjogren's syndrome [de-identified] : Overall patient is feeling well she states her dry mouth is better in the summer but arise do remain dry.  She has not followed up on rheumatology as yet

## 2024-05-22 NOTE — ASSESSMENT
[FreeTextEntry1] : This is a 73-year-old female whose history has been reviewed above  She has a history of hypertension she has been placed on losartan her blood pressure has normalized she has no orthostasis BUN/creatinine and electrolytes were obtained  She does have a history of hypercholesterolemia she is on Zetia because of statin intolerance.  A cholesterol profile has been obtained medication changes predicated on the results.  We could consider bempedoic acid but she has no evidence of significant vascular disease and I am not sure it would be worth the change but we will analyze her cholesterol profile  She continues to have dry eyes she does have an appointment for a consult with rheumatology at this point she does have positive antibodies she certainly does not seem to have a severe case but we will continue to look for signs and symptoms and get the input of rheumatology.  She has no joint pain and no dysuria  She does have chronic prolapse I did give her the name of a GYN urologist

## 2024-05-24 ENCOUNTER — RX RENEWAL (OUTPATIENT)
Age: 74
End: 2024-05-24

## 2024-05-24 LAB
25(OH)D3 SERPL-MCNC: 52 NG/ML
ALBUMIN SERPL ELPH-MCNC: 4.1 G/DL
ALP BLD-CCNC: 81 U/L
ALT SERPL-CCNC: 20 U/L
ANION GAP SERPL CALC-SCNC: 12 MMOL/L
AST SERPL-CCNC: 29 U/L
BASOPHILS # BLD AUTO: 0.04 K/UL
BASOPHILS NFR BLD AUTO: 0.7 %
BILIRUB SERPL-MCNC: 0.3 MG/DL
BUN SERPL-MCNC: 24 MG/DL
CALCIUM SERPL-MCNC: 9.1 MG/DL
CHLORIDE SERPL-SCNC: 106 MMOL/L
CHOLEST SERPL-MCNC: 168 MG/DL
CO2 SERPL-SCNC: 22 MMOL/L
CREAT SERPL-MCNC: 0.96 MG/DL
EGFR: 62 ML/MIN/1.73M2
EOSINOPHIL # BLD AUTO: 0.25 K/UL
EOSINOPHIL NFR BLD AUTO: 4.4 %
ESTIMATED AVERAGE GLUCOSE: 114 MG/DL
GLUCOSE SERPL-MCNC: 89 MG/DL
HBA1C MFR BLD HPLC: 5.6 %
HCT VFR BLD CALC: 33.1 %
HDLC SERPL-MCNC: 38 MG/DL
HGB BLD-MCNC: 11 G/DL
IMM GRANULOCYTES NFR BLD AUTO: 0.2 %
LDLC SERPL CALC-MCNC: 92 MG/DL
LYMPHOCYTES # BLD AUTO: 1.7 K/UL
LYMPHOCYTES NFR BLD AUTO: 29.8 %
MAN DIFF?: NORMAL
MCHC RBC-ENTMCNC: 30.4 PG
MCHC RBC-ENTMCNC: 33.2 GM/DL
MCV RBC AUTO: 91.4 FL
MONOCYTES # BLD AUTO: 0.46 K/UL
MONOCYTES NFR BLD AUTO: 8.1 %
NEUTROPHILS # BLD AUTO: 3.25 K/UL
NEUTROPHILS NFR BLD AUTO: 56.8 %
NONHDLC SERPL-MCNC: 130 MG/DL
PLATELET # BLD AUTO: 207 K/UL
POTASSIUM SERPL-SCNC: 4.7 MMOL/L
PROT SERPL-MCNC: 6.6 G/DL
RBC # BLD: 3.62 M/UL
RBC # FLD: 14.4 %
SODIUM SERPL-SCNC: 139 MMOL/L
T3RU NFR SERPL: 1 TBI
T4 SERPL-MCNC: 5.2 UG/DL
TRIGL SERPL-MCNC: 223 MG/DL
TSH SERPL-ACNC: 2.22 UIU/ML
URATE SERPL-MCNC: 3.9 MG/DL
WBC # FLD AUTO: 5.71 K/UL

## 2024-05-24 RX ORDER — LOSARTAN POTASSIUM 50 MG/1
50 TABLET, FILM COATED ORAL DAILY
Qty: 1 | Refills: 2 | Status: ACTIVE | COMMUNITY
Start: 2023-09-12 | End: 1900-01-01

## 2024-05-29 ENCOUNTER — RX RENEWAL (OUTPATIENT)
Age: 74
End: 2024-05-29

## 2024-05-29 RX ORDER — EZETIMIBE 10 MG/1
10 TABLET ORAL
Qty: 90 | Refills: 0 | Status: ACTIVE | COMMUNITY
Start: 2024-02-07 | End: 1900-01-01

## 2024-06-07 ENCOUNTER — APPOINTMENT (OUTPATIENT)
Dept: UROGYNECOLOGY | Facility: CLINIC | Age: 74
End: 2024-06-07

## 2024-06-07 VITALS
SYSTOLIC BLOOD PRESSURE: 145 MMHG | DIASTOLIC BLOOD PRESSURE: 79 MMHG | HEIGHT: 59 IN | BODY MASS INDEX: 24.6 KG/M2 | WEIGHT: 122 LBS | HEART RATE: 61 BPM

## 2024-06-07 DIAGNOSIS — N81.11 CYSTOCELE, MIDLINE: ICD-10-CM

## 2024-06-07 DIAGNOSIS — R39.15 URGENCY OF URINATION: ICD-10-CM

## 2024-06-07 DIAGNOSIS — N95.8 OTHER SPECIFIED MENOPAUSAL AND PERIMENOPAUSAL DISORDERS: ICD-10-CM

## 2024-06-07 LAB
BILIRUB UR QL STRIP: NORMAL
CLARITY UR: CLEAR
COLLECTION METHOD: NORMAL
GLUCOSE UR-MCNC: NORMAL
HCG UR QL: 0.2 EU/DL
HGB UR QL STRIP.AUTO: ABNORMAL
KETONES UR-MCNC: NORMAL
LEUKOCYTE ESTERASE UR QL STRIP: NORMAL
NITRITE UR QL STRIP: NORMAL
PH UR STRIP: 7
PROT UR STRIP-MCNC: NORMAL
SP GR UR STRIP: 1.01

## 2024-06-07 PROCEDURE — 99204 OFFICE O/P NEW MOD 45 MIN: CPT | Mod: 25

## 2024-06-07 PROCEDURE — 81003 URINALYSIS AUTO W/O SCOPE: CPT | Mod: QW

## 2024-06-07 PROCEDURE — 51701 INSERT BLADDER CATHETER: CPT

## 2024-06-07 RX ORDER — ESTRADIOL 0.1 MG/G
0.1 CREAM VAGINAL
Qty: 1 | Refills: 3 | Status: ACTIVE | COMMUNITY
Start: 2024-06-07 | End: 1900-01-01

## 2024-06-07 NOTE — DISCUSSION/SUMMARY
[FreeTextEntry1] : #Prolapse - Stage II POP noted on exam today - Pt reports symptoms improved recently - Discussed management alternatives: doing nothing, pelvic floor exercises with or without PT, pessary, surgical intervention. - May consider pessary, for now will work on pelvic floor strengthening.  #Urinary urgency/frequency - Vaginal estrogen therapy - Pelvic floor PT, referral for STARS placed - Behavioral/fluid modifications  #Fecal incontinence - Stool bulking with increased fiber intake - Pelvic floor PT - Will refer to colorectal if symptoms not responsive to above  Follow-up in 2 months.

## 2024-06-07 NOTE — PHYSICAL EXAM
[Chaperone Present] : A chaperone was present in the examining room during all aspects of the physical examination [[id number]] : A chaperone was present during the pelvic exam. [FreeTextEntry2] : DARIA Blas [FreeTextEntry1] : General: Well, appearing, no acute distress HEENT: Normocephalic, atraumatic Respiratory: Speaking in full sentences comfortably, normal work of breathing and no cough during visit Extremities: No upper extremity edema noted Skin: No obvious rash or skin lesions Neuro: Alert and oriented x 3, speech is fluent, normal rate Psych: Normal mood and affect [Labia Majora] : were normal [Labia Minora] : were normal [Atrophy] : atrophy [Dry Mucosa] : dry mucosa [1] : 1 [Aa ____] : Aa [unfilled] [Ba ____] : Ba [unfilled] [C ____] : C [unfilled] [GH ____] : GH [unfilled] [PB ____] : PB [unfilled] [TVL ____] : TVL  [unfilled] [Ap ____] : Ap [unfilled] [Bp ____] : Bp [unfilled] [D ____] : D [unfilled] [Normal] : normal [FreeTextEntry4] : significant irritation of vaginal introitus

## 2024-06-07 NOTE — HISTORY OF PRESENT ILLNESS
[FreeTextEntry1] : DANIEL CROCKETT is a 73-year-old P3 presenting for evaluation of prolapse. Patient reports longstanding history of vaginal bulge since 2019. Reports bulge was worsening overtime, however over the last 1.5 months, she has not noticed any significant bulge. It does not cause pain or discomfort; however, she is aware of it.  She is bothered by urinary urgency. No leakage with exertion or activities. Also reports some issues with fecal incontinence at times. Reports that she follows with GI.  Daytime voids: 5  Nighttime voids: 3-4  Her fluid intake includes: 1 cups of black tea, 32 oz water, milk with cereal and 2 hours before bed

## 2024-06-08 LAB
APPEARANCE: CLEAR
BACTERIA: NEGATIVE /HPF
BILIRUBIN URINE: NEGATIVE
BLOOD URINE: NEGATIVE
CAST: 0 /LPF
COLOR: YELLOW
EPITHELIAL CELLS: 0 /HPF
GLUCOSE QUALITATIVE U: NEGATIVE MG/DL
KETONES URINE: NEGATIVE MG/DL
LEUKOCYTE ESTERASE URINE: NEGATIVE
MICROSCOPIC-UA: NORMAL
NITRITE URINE: NEGATIVE
PH URINE: 7
PROTEIN URINE: NEGATIVE MG/DL
RED BLOOD CELLS URINE: 0 /HPF
SPECIFIC GRAVITY URINE: 1.01
UROBILINOGEN URINE: 0.2 MG/DL
WHITE BLOOD CELLS URINE: 0 /HPF

## 2024-06-09 LAB — BACTERIA UR CULT: NORMAL

## 2024-06-10 ENCOUNTER — RX RENEWAL (OUTPATIENT)
Age: 74
End: 2024-06-10

## 2024-06-22 NOTE — ASU PREOPERATIVE ASSESSMENT, ADULT ([facility] ONLY) - INV PERIPH IV SIZE
22 gauge PAST MEDICAL HISTORY:  Colon Cancer s/p colon rection 3/25/09 no recurrence to date, did not require chemo nor radiation    Former smoker during teenage years    Functional Murmur childhood polio and rheumatic fever    H/O: Hypertension     Hernia, Umbilical surgically repaired 2009 during colon resection 2009 amd again repaired in 2010 with mesh    History of Ovarian Cyst left    Single Renal Cyst right 10cm (per report)    Thyroid Nodule benign since 08

## 2024-07-29 PROBLEM — Z86.69 HISTORY OF BELL'S PALSY: Status: RESOLVED | Noted: 2019-12-10 | Resolved: 2024-07-29

## 2024-08-09 ENCOUNTER — RESULT REVIEW (OUTPATIENT)
Age: 74
End: 2024-08-09

## 2024-08-09 ENCOUNTER — APPOINTMENT (OUTPATIENT)
Dept: UROGYNECOLOGY | Facility: CLINIC | Age: 74
End: 2024-08-09

## 2024-08-09 ENCOUNTER — APPOINTMENT (OUTPATIENT)
Dept: ULTRASOUND IMAGING | Facility: IMAGING CENTER | Age: 74
End: 2024-08-09

## 2024-08-09 ENCOUNTER — APPOINTMENT (OUTPATIENT)
Dept: MAMMOGRAPHY | Facility: IMAGING CENTER | Age: 74
End: 2024-08-09

## 2024-08-09 ENCOUNTER — OUTPATIENT (OUTPATIENT)
Dept: OUTPATIENT SERVICES | Facility: HOSPITAL | Age: 74
LOS: 1 days | End: 2024-08-09
Payer: MEDICARE

## 2024-08-09 DIAGNOSIS — Z00.8 ENCOUNTER FOR OTHER GENERAL EXAMINATION: ICD-10-CM

## 2024-08-09 DIAGNOSIS — Z98.890 OTHER SPECIFIED POSTPROCEDURAL STATES: Chronic | ICD-10-CM

## 2024-08-09 DIAGNOSIS — Z00.00 ENCOUNTER FOR GENERAL ADULT MEDICAL EXAMINATION WITHOUT ABNORMAL FINDINGS: ICD-10-CM

## 2024-08-09 PROCEDURE — 77063 BREAST TOMOSYNTHESIS BI: CPT | Mod: 26

## 2024-08-09 PROCEDURE — 77067 SCR MAMMO BI INCL CAD: CPT

## 2024-08-09 PROCEDURE — 77063 BREAST TOMOSYNTHESIS BI: CPT

## 2024-08-09 PROCEDURE — 76641 ULTRASOUND BREAST COMPLETE: CPT

## 2024-08-09 PROCEDURE — 76641 ULTRASOUND BREAST COMPLETE: CPT | Mod: 26,50

## 2024-08-09 PROCEDURE — 77067 SCR MAMMO BI INCL CAD: CPT | Mod: 26

## 2024-08-20 DIAGNOSIS — R92.8 OTHER ABNORMAL AND INCONCLUSIVE FINDINGS ON DIAGNOSTIC IMAGING OF BREAST: ICD-10-CM

## 2024-09-05 ENCOUNTER — OUTPATIENT (OUTPATIENT)
Dept: OUTPATIENT SERVICES | Facility: HOSPITAL | Age: 74
LOS: 1 days | End: 2024-09-05
Payer: MEDICARE

## 2024-09-05 ENCOUNTER — APPOINTMENT (OUTPATIENT)
Dept: ULTRASOUND IMAGING | Facility: IMAGING CENTER | Age: 74
End: 2024-09-05
Payer: MEDICARE

## 2024-09-05 ENCOUNTER — RESULT REVIEW (OUTPATIENT)
Age: 74
End: 2024-09-05

## 2024-09-05 ENCOUNTER — APPOINTMENT (OUTPATIENT)
Dept: MAMMOGRAPHY | Facility: IMAGING CENTER | Age: 74
End: 2024-09-05
Payer: MEDICARE

## 2024-09-05 DIAGNOSIS — Z00.8 ENCOUNTER FOR OTHER GENERAL EXAMINATION: ICD-10-CM

## 2024-09-05 DIAGNOSIS — Z98.890 OTHER SPECIFIED POSTPROCEDURAL STATES: Chronic | ICD-10-CM

## 2024-09-05 DIAGNOSIS — R92.8 OTHER ABNORMAL AND INCONCLUSIVE FINDINGS ON DIAGNOSTIC IMAGING OF BREAST: ICD-10-CM

## 2024-09-05 PROCEDURE — 76642 ULTRASOUND BREAST LIMITED: CPT | Mod: 26,LT

## 2024-09-05 PROCEDURE — G0279: CPT | Mod: 26

## 2024-09-05 PROCEDURE — 77065 DX MAMMO INCL CAD UNI: CPT | Mod: 26,LT

## 2024-09-05 PROCEDURE — 76642 ULTRASOUND BREAST LIMITED: CPT

## 2024-09-05 PROCEDURE — G0279: CPT

## 2024-09-05 PROCEDURE — 77065 DX MAMMO INCL CAD UNI: CPT

## 2024-09-25 ENCOUNTER — LABORATORY RESULT (OUTPATIENT)
Age: 74
End: 2024-09-25

## 2024-09-25 ENCOUNTER — APPOINTMENT (OUTPATIENT)
Dept: INTERNAL MEDICINE | Facility: CLINIC | Age: 74
End: 2024-09-25
Payer: MEDICARE

## 2024-09-25 VITALS
WEIGHT: 121 LBS | BODY MASS INDEX: 24.39 KG/M2 | DIASTOLIC BLOOD PRESSURE: 78 MMHG | SYSTOLIC BLOOD PRESSURE: 120 MMHG | HEIGHT: 59 IN

## 2024-09-25 DIAGNOSIS — D64.9 ANEMIA, UNSPECIFIED: ICD-10-CM

## 2024-09-25 DIAGNOSIS — E78.00 PURE HYPERCHOLESTEROLEMIA, UNSPECIFIED: ICD-10-CM

## 2024-09-25 DIAGNOSIS — I10 ESSENTIAL (PRIMARY) HYPERTENSION: ICD-10-CM

## 2024-09-25 PROCEDURE — 99214 OFFICE O/P EST MOD 30 MIN: CPT

## 2024-09-25 PROCEDURE — 36415 COLL VENOUS BLD VENIPUNCTURE: CPT

## 2024-09-25 PROCEDURE — G2211 COMPLEX E/M VISIT ADD ON: CPT

## 2024-09-25 RX ORDER — UBIDECARENONE 200 MG
CAPSULE ORAL DAILY
Refills: 0 | Status: ACTIVE | COMMUNITY

## 2024-09-25 RX ORDER — CALCIUM CARBONATE/VITAMIN D3 500MG-5MCG
TABLET ORAL DAILY
Refills: 0 | Status: ACTIVE | COMMUNITY

## 2024-09-25 NOTE — PHYSICAL EXAM
[No JVD] : no jugular venous distention [Normal] : normal rate, regular rhythm, normal S1 and S2 and no murmur heard [No Focal Deficits] : no focal deficits

## 2024-09-25 NOTE — ASSESSMENT
[FreeTextEntry1] : This is a 73-year-old female whose history has been reviewed above  She has a history of hypertension her blood pressure is at goal she has no orthostasis.  We will maintain her on losartan appropriate blood tests including BUN/creatinine and potassium were obtained  She has history of hypercholesterolemia she is tolerating Zetia cholesterol profile has been obtained medication changes predicated on results  She is SSA positive SSB negative as far as Sjogren's she did see rheumatology serologic studies were taken but no results were forwarded.  She will be following up with rheumatology.  There has been no increase in her symptoms  Her reflux has been stable she takes a PPI occasionally no intervention  She does have a history of anemia she did see hematology but we will continue to follow her CBCs

## 2024-09-25 NOTE — HISTORY OF PRESENT ILLNESS
[FreeTextEntry1] : This is a 73-year-old female for evaluation and treatment of her hypertension hypercholesterolemia dry eyes reflux [de-identified] : Patient has been compliant with her medications able to tolerate Zetia.  She did see rheumatology but no diagnosis was obtained  Overall she is feeling well but still suffers from dry mouth and dry eyes

## 2024-09-26 LAB
ALBUMIN SERPL ELPH-MCNC: 4.5 G/DL
ALP BLD-CCNC: 78 U/L
ALT SERPL-CCNC: 15 U/L
ANION GAP SERPL CALC-SCNC: 14 MMOL/L
AST SERPL-CCNC: 25 U/L
BASOPHILS # BLD AUTO: 0.04 K/UL
BASOPHILS NFR BLD AUTO: 0.8 %
BILIRUB SERPL-MCNC: 0.3 MG/DL
BUN SERPL-MCNC: 23 MG/DL
CALCIUM SERPL-MCNC: 9.8 MG/DL
CHLORIDE SERPL-SCNC: 102 MMOL/L
CHOLEST SERPL-MCNC: 171 MG/DL
CO2 SERPL-SCNC: 26 MMOL/L
CREAT SERPL-MCNC: 0.93 MG/DL
EGFR: 64 ML/MIN/1.73M2
EOSINOPHIL # BLD AUTO: 0.13 K/UL
EOSINOPHIL NFR BLD AUTO: 2.6 %
ESTIMATED AVERAGE GLUCOSE: 111 MG/DL
GLUCOSE SERPL-MCNC: 92 MG/DL
HBA1C MFR BLD HPLC: 5.5 %
HCT VFR BLD CALC: 36.3 %
HDLC SERPL-MCNC: 43 MG/DL
HGB BLD-MCNC: 11.5 G/DL
IMM GRANULOCYTES NFR BLD AUTO: 0.2 %
LDLC SERPL CALC-MCNC: 100 MG/DL
LYMPHOCYTES # BLD AUTO: 1.87 K/UL
LYMPHOCYTES NFR BLD AUTO: 37.9 %
MAN DIFF?: NORMAL
MCHC RBC-ENTMCNC: 30.3 PG
MCHC RBC-ENTMCNC: 31.7 GM/DL
MCV RBC AUTO: 95.8 FL
MONOCYTES # BLD AUTO: 0.42 K/UL
MONOCYTES NFR BLD AUTO: 8.5 %
NEUTROPHILS # BLD AUTO: 2.47 K/UL
NEUTROPHILS NFR BLD AUTO: 50 %
NONHDLC SERPL-MCNC: 128 MG/DL
PLATELET # BLD AUTO: 206 K/UL
POTASSIUM SERPL-SCNC: 4.7 MMOL/L
PROT SERPL-MCNC: 7.2 G/DL
RBC # BLD: 3.79 M/UL
RBC # FLD: 14.5 %
SODIUM SERPL-SCNC: 142 MMOL/L
T3RU NFR SERPL: 1 TBI
T4 SERPL-MCNC: 5.3 UG/DL
TRIGL SERPL-MCNC: 156 MG/DL
TSH SERPL-ACNC: 2.47 UIU/ML
URATE SERPL-MCNC: 4.2 MG/DL
WBC # FLD AUTO: 4.94 K/UL

## 2024-11-01 ENCOUNTER — APPOINTMENT (OUTPATIENT)
Dept: INTERNAL MEDICINE | Facility: CLINIC | Age: 74
End: 2024-11-01
Payer: MEDICARE

## 2024-11-01 PROCEDURE — G0008: CPT

## 2024-11-01 PROCEDURE — 90662 IIV NO PRSV INCREASED AG IM: CPT

## 2024-12-13 ENCOUNTER — APPOINTMENT (OUTPATIENT)
Dept: UROGYNECOLOGY | Facility: CLINIC | Age: 74
End: 2024-12-13
Payer: MEDICARE

## 2024-12-13 VITALS — SYSTOLIC BLOOD PRESSURE: 105 MMHG | DIASTOLIC BLOOD PRESSURE: 58 MMHG | HEART RATE: 65 BPM

## 2024-12-13 DIAGNOSIS — N81.11 CYSTOCELE, MIDLINE: ICD-10-CM

## 2024-12-13 PROCEDURE — 99213 OFFICE O/P EST LOW 20 MIN: CPT

## 2025-01-20 ENCOUNTER — NON-APPOINTMENT (OUTPATIENT)
Age: 75
End: 2025-01-20

## 2025-01-24 ENCOUNTER — APPOINTMENT (OUTPATIENT)
Facility: CLINIC | Age: 75
End: 2025-01-24

## 2025-02-14 ENCOUNTER — LABORATORY RESULT (OUTPATIENT)
Age: 75
End: 2025-02-14

## 2025-02-14 ENCOUNTER — APPOINTMENT (OUTPATIENT)
Dept: INTERNAL MEDICINE | Facility: CLINIC | Age: 75
End: 2025-02-14
Payer: MEDICARE

## 2025-02-14 ENCOUNTER — NON-APPOINTMENT (OUTPATIENT)
Age: 75
End: 2025-02-14

## 2025-02-14 VITALS
DIASTOLIC BLOOD PRESSURE: 82 MMHG | HEIGHT: 59 IN | BODY MASS INDEX: 24.6 KG/M2 | WEIGHT: 122 LBS | SYSTOLIC BLOOD PRESSURE: 138 MMHG

## 2025-02-14 DIAGNOSIS — E78.00 PURE HYPERCHOLESTEROLEMIA, UNSPECIFIED: ICD-10-CM

## 2025-02-14 DIAGNOSIS — E78.5 HYPERLIPIDEMIA, UNSPECIFIED: ICD-10-CM

## 2025-02-14 DIAGNOSIS — Z00.00 ENCOUNTER FOR GENERAL ADULT MEDICAL EXAMINATION W/OUT ABNORMAL FINDINGS: ICD-10-CM

## 2025-02-14 DIAGNOSIS — D64.9 ANEMIA, UNSPECIFIED: ICD-10-CM

## 2025-02-14 DIAGNOSIS — I10 ESSENTIAL (PRIMARY) HYPERTENSION: ICD-10-CM

## 2025-02-14 DIAGNOSIS — K21.9 GASTRO-ESOPHAGEAL REFLUX DISEASE W/OUT ESOPHAGITIS: ICD-10-CM

## 2025-02-14 PROCEDURE — 93000 ELECTROCARDIOGRAM COMPLETE: CPT

## 2025-02-14 PROCEDURE — G0439: CPT

## 2025-02-14 PROCEDURE — G2211 COMPLEX E/M VISIT ADD ON: CPT

## 2025-02-14 PROCEDURE — 36415 COLL VENOUS BLD VENIPUNCTURE: CPT

## 2025-02-14 PROCEDURE — 99213 OFFICE O/P EST LOW 20 MIN: CPT | Mod: 25

## 2025-02-14 RX ORDER — CIPROFLOXACIN 3 MG/ML
0.3 SOLUTION OPHTHALMIC EVERY 4 HOURS
Qty: 1 | Refills: 0 | Status: ACTIVE | COMMUNITY
Start: 2025-02-14 | End: 1900-01-01

## 2025-02-16 LAB
25(OH)D3 SERPL-MCNC: 41.2 NG/ML
ALBUMIN SERPL ELPH-MCNC: 4.4 G/DL
ALP BLD-CCNC: 77 U/L
ALT SERPL-CCNC: 17 U/L
ANION GAP SERPL CALC-SCNC: 10 MMOL/L
APPEARANCE: CLEAR
AST SERPL-CCNC: 26 U/L
BASOPHILS # BLD AUTO: 0.04 K/UL
BASOPHILS NFR BLD AUTO: 0.8 %
BILIRUB SERPL-MCNC: 0.4 MG/DL
BILIRUBIN URINE: NEGATIVE
BLOOD URINE: NEGATIVE
BUN SERPL-MCNC: 18 MG/DL
CALCIUM SERPL-MCNC: 9.9 MG/DL
CHLORIDE SERPL-SCNC: 103 MMOL/L
CHOLEST SERPL-MCNC: 185 MG/DL
CO2 SERPL-SCNC: 28 MMOL/L
COLOR: YELLOW
CREAT SERPL-MCNC: 0.88 MG/DL
EGFR: 68 ML/MIN/1.73M2
EOSINOPHIL # BLD AUTO: 0.2 K/UL
EOSINOPHIL NFR BLD AUTO: 4 %
ESTIMATED AVERAGE GLUCOSE: 117 MG/DL
GLUCOSE QUALITATIVE U: NEGATIVE MG/DL
GLUCOSE SERPL-MCNC: 83 MG/DL
HBA1C MFR BLD HPLC: 5.7 %
HCT VFR BLD CALC: 34.8 %
HDLC SERPL-MCNC: 47 MG/DL
HGB BLD-MCNC: 11 G/DL
IMM GRANULOCYTES NFR BLD AUTO: 0.2 %
KETONES URINE: NEGATIVE MG/DL
LDLC SERPL CALC-MCNC: 110 MG/DL
LEUKOCYTE ESTERASE URINE: NEGATIVE
LYMPHOCYTES # BLD AUTO: 1.85 K/UL
LYMPHOCYTES NFR BLD AUTO: 37.1 %
MAGNESIUM SERPL-MCNC: 2.3 MG/DL
MAN DIFF?: NORMAL
MCHC RBC-ENTMCNC: 30.1 PG
MCHC RBC-ENTMCNC: 31.6 G/DL
MCV RBC AUTO: 95.3 FL
MONOCYTES # BLD AUTO: 0.49 K/UL
MONOCYTES NFR BLD AUTO: 9.8 %
NEUTROPHILS # BLD AUTO: 2.4 K/UL
NEUTROPHILS NFR BLD AUTO: 48.1 %
NITRITE URINE: NEGATIVE
NONHDLC SERPL-MCNC: 138 MG/DL
PH URINE: 7.5
PLATELET # BLD AUTO: 184 K/UL
POTASSIUM SERPL-SCNC: 4.5 MMOL/L
PROT SERPL-MCNC: 7.1 G/DL
PROTEIN URINE: NEGATIVE MG/DL
RBC # BLD: 3.65 M/UL
RBC # FLD: 14.9 %
SODIUM SERPL-SCNC: 141 MMOL/L
SPECIFIC GRAVITY URINE: 1.01
T3RU NFR SERPL: 1 TBI
T4 SERPL-MCNC: 5.3 UG/DL
TRIGL SERPL-MCNC: 158 MG/DL
TSH SERPL-ACNC: 3.07 UIU/ML
URATE SERPL-MCNC: 3.9 MG/DL
UROBILINOGEN URINE: 0.2 MG/DL
WBC # FLD AUTO: 4.99 K/UL

## 2025-02-18 ENCOUNTER — TRANSCRIPTION ENCOUNTER (OUTPATIENT)
Age: 75
End: 2025-02-18

## 2025-04-16 ENCOUNTER — RX RENEWAL (OUTPATIENT)
Age: 75
End: 2025-04-16